# Patient Record
Sex: FEMALE | NOT HISPANIC OR LATINO | Employment: STUDENT
[De-identification: names, ages, dates, MRNs, and addresses within clinical notes are randomized per-mention and may not be internally consistent; named-entity substitution may affect disease eponyms.]

---

## 2017-07-08 ENCOUNTER — HEALTH MAINTENANCE LETTER (OUTPATIENT)
Age: 22
End: 2017-07-08

## 2017-09-13 ENCOUNTER — APPOINTMENT (OUTPATIENT)
Dept: GENERAL RADIOLOGY | Facility: CLINIC | Age: 22
End: 2017-09-13
Attending: FAMILY MEDICINE
Payer: MEDICAID

## 2017-09-13 ENCOUNTER — HOSPITAL ENCOUNTER (EMERGENCY)
Facility: CLINIC | Age: 22
Discharge: HOME | End: 2017-09-13
Attending: FAMILY MEDICINE | Admitting: FAMILY MEDICINE
Payer: MEDICAID

## 2017-09-13 VITALS
TEMPERATURE: 98.5 F | DIASTOLIC BLOOD PRESSURE: 100 MMHG | OXYGEN SATURATION: 99 % | HEART RATE: 94 BPM | WEIGHT: 293 LBS | HEIGHT: 70 IN | SYSTOLIC BLOOD PRESSURE: 142 MMHG | BODY MASS INDEX: 41.95 KG/M2 | RESPIRATION RATE: 22 BRPM

## 2017-09-13 DIAGNOSIS — R07.0 THROAT PAIN: ICD-10-CM

## 2017-09-13 DIAGNOSIS — J20.9 ACUTE BRONCHITIS, UNSPECIFIED ORGANISM: ICD-10-CM

## 2017-09-13 LAB
DEPRECATED S PYO AG THROAT QL EIA: NORMAL
SPECIMEN SOURCE: NORMAL

## 2017-09-13 PROCEDURE — 87081 CULTURE SCREEN ONLY: CPT | Performed by: FAMILY MEDICINE

## 2017-09-13 PROCEDURE — 94640 AIRWAY INHALATION TREATMENT: CPT | Performed by: FAMILY MEDICINE

## 2017-09-13 PROCEDURE — 87880 STREP A ASSAY W/OPTIC: CPT | Performed by: FAMILY MEDICINE

## 2017-09-13 PROCEDURE — 99284 EMERGENCY DEPT VISIT MOD MDM: CPT | Mod: Z6 | Performed by: FAMILY MEDICINE

## 2017-09-13 PROCEDURE — 99284 EMERGENCY DEPT VISIT MOD MDM: CPT | Mod: 25 | Performed by: FAMILY MEDICINE

## 2017-09-13 PROCEDURE — 25000125 ZZHC RX 250: Performed by: FAMILY MEDICINE

## 2017-09-13 PROCEDURE — 25000132 ZZH RX MED GY IP 250 OP 250 PS 637: Performed by: FAMILY MEDICINE

## 2017-09-13 PROCEDURE — 71020 XR CHEST 2 VW: CPT

## 2017-09-13 RX ORDER — ALBUTEROL SULFATE 90 UG/1
2 AEROSOL, METERED RESPIRATORY (INHALATION) EVERY 6 HOURS PRN
Qty: 1 INHALER | Refills: 0 | Status: SHIPPED | OUTPATIENT
Start: 2017-09-13

## 2017-09-13 RX ORDER — ACETAMINOPHEN 500 MG
1000 TABLET ORAL ONCE
Status: COMPLETED | OUTPATIENT
Start: 2017-09-13 | End: 2017-09-13

## 2017-09-13 RX ORDER — AZITHROMYCIN 250 MG/1
TABLET, FILM COATED ORAL
Qty: 6 TABLET | Refills: 0 | Status: SHIPPED | OUTPATIENT
Start: 2017-09-13 | End: 2017-09-18

## 2017-09-13 RX ORDER — ALBUTEROL SULFATE 0.83 MG/ML
2.5 SOLUTION RESPIRATORY (INHALATION) ONCE
Status: COMPLETED | OUTPATIENT
Start: 2017-09-13 | End: 2017-09-13

## 2017-09-13 RX ADMIN — ACETAMINOPHEN 1000 MG: 500 TABLET, FILM COATED ORAL at 19:45

## 2017-09-13 RX ADMIN — ALBUTEROL SULFATE 2.5 MG: 2.5 SOLUTION RESPIRATORY (INHALATION) at 19:45

## 2017-09-13 ASSESSMENT — ENCOUNTER SYMPTOMS
EYE REDNESS: 0
FATIGUE: 1
NAUSEA: 0
VOMITING: 0
ARTHRALGIAS: 0
WOUND: 0
DECREASED CONCENTRATION: 0
SORE THROAT: 1
ACTIVITY CHANGE: 0
SINUS PRESSURE: 1
TROUBLE SWALLOWING: 0
COUGH: 1
SHORTNESS OF BREATH: 1
CONFUSION: 0
COLOR CHANGE: 0
DIFFICULTY URINATING: 0
FEVER: 0
VOICE CHANGE: 0
WEAKNESS: 0
ABDOMINAL PAIN: 0
APPETITE CHANGE: 0
NECK STIFFNESS: 0
HEADACHES: 0
DYSPHORIC MOOD: 0

## 2017-09-13 NOTE — ED AVS SNAPSHOT
[facility], [place], Emergency Department    [address] [place] [id number]    Phone:  [phone]                                       Evie Clement   MRN: [id number]    Department:  [facility], Emergency Department   Date of Visit:  9/13/2017           After Visit Summary Signature Page     I have received my discharge instructions, and my questions have been answered. I have discussed any challenges I see with this plan with the nurse or doctor.    ..........................................................................................................................................  Patient/Patient Representative Signature      ..........................................................................................................................................  Patient Representative Print Name and Relationship to Patient    ..................................................               ................................................  Date                                            Time    ..........................................................................................................................................  Reviewed by Signature/Title    ...................................................              ..............................................  Date                                                            Time

## 2017-09-13 NOTE — LETTER
To Whom it may concern:      Evie Clement was seen in our Emergency Department today, 09/13/17.  I expect her condition to improve over the next few days.  she may return to school when improved.    Sincerely,        Rebel Monae MD

## 2017-09-13 NOTE — ED AVS SNAPSHOT
[facility], Emergency Department    [address] [facility] [place] [address]    Phone:  [phone]                                       Evie Clement   MRN: [id number]    Department:  [facility], Emergency Department   Date of Visit:  9/13/2017           Patient Information     Date Of Birth          1995        Your diagnoses for this visit were:     Acute bronchitis, unspecified organism     Throat pain        You were seen by Rebel Monae MD.      Follow-up Information     Follow up with No Ref-Primary, Physician.        Discharge Instructions         Home.  Zpak for infection.  Albuterol inhaler for cough and breathing.  Results for orders placed or performed during the hospital encounter of 09/13/17   XR Chest 2 Views    Narrative    No acute airspace disease.   Rapid strep screen   Result Value Ref Range    Specimen Description Throat     Rapid Strep A Screen       NEGATIVE: No Group A streptococcal antigen detected by immunoassay, await culture report.             Bronchitis, Antibiotic Treatment (Adult)    Bronchitis is an infection of the air passages (bronchial tubes) in your lungs. It often occurs when you have a cold. This illness is contagious during the first few days and is spread through the air by coughing and sneezing, or by direct contact (touching the sick person and then touching your own eyes, nose, or mouth).  Symptoms of bronchitis include cough with mucus (phlegm) and low-grade fever. Bronchitis usually lasts 7 to 14 days. Mild cases can be treated with simple home remedies. More severe infection is treated with an antibiotic.  Home care  Follow these guidelines when caring for yourself at home:    If your symptoms are severe, rest at home for the first 2 to 3 days. When you go back to your usual activities, don't let yourself get too tired.    Do not smoke. Also avoid being exposed to secondhand smoke.    You may use over-the-counter medicines to control fever or pain,  unless another medicine was prescribed. (Note: If you have chronic liver or kidney disease or have ever had a stomach ulcer or gastrointestinal bleeding, talk with your healthcare provider before using these medicines. Also talk to your provider if you are taking medicine to prevent blood clots.) Aspirin should never be given to anyone younger than 18 years of age who is ill with a viral infection or fever. It may cause severe liver or brain damage.    Your appetite may be poor, so a light diet is fine. Avoid dehydration by drinking 6 to 8 glasses of fluids per day (such as water, soft drinks, sports drinks, juices, tea, or soup). Extra fluids will help loosen secretions in the nose and lungs.    Over-the-counter cough, cold, and sore-throat medicines will not shorten the length of the illness, but they may be helpful to reduce symptoms. (Note: Do not use decongestants if you have high blood pressure.)    Finish all antibiotic medicine. Do this even if you are feeling better after only a few days.  Follow-up care  Follow up with your healthcare provider, or as advised. If you had an X-ray or ECG (electrocardiogram), a specialist will review it. You will be notified of any new findings that may affect your care.  Note: If you are age 65 or older, or if you have a chronic lung disease or condition that affects your immune system, or you smoke, talk to your healthcare provider about having pneumococcal vaccinations and a yearly influenza vaccination (flu shot).  When to seek medical advice  Call your healthcare provider right away if any of these occur:    Fever of 100.4 F (38 C) or higher    Coughing up increased amounts of colored sputum    Weakness, drowsiness, headache, facial pain, ear pain, or a stiff neck  Call 911, or get immediate medical care  Contact emergency services right away if any of these occur.    Coughing up blood    Worsening weakness, drowsiness, headache, or stiff neck    Trouble breathing,  wheezing, or pain with breathing  Date Last Reviewed: 9/13/2015 2000-2017 [employer]. [address] [place] [address], [place] [address]. All rights reserved. This information is not intended as a substitute for professional medical care. Always follow your healthcare professional's instructions.          24 Hour Appointment Hotline       To make an appointment at any [facility], call [phone] ([phone]). If you don't have a family doctor or clinic, we will help you find one. [facility] are conveniently located to serve the needs of you and your family.             Review of your medicines      START taking        Dose / Directions Last dose taken    albuterol 108 (90 BASE) MCG/ACT Inhaler   Commonly known as:  PROAIR HFA/PROVENTIL HFA/VENTOLIN HFA   Dose:  2 puff   Quantity:  1 Inhaler        Inhale 2 puffs into the lungs every 6 hours as needed for shortness of breath / dyspnea or wheezing   Refills:  0        azithromycin 250 MG tablet   Commonly known as:  ZITHROMAX Z-MEMO   Quantity:  6 tablet        Two tablets on the first day, then one tablet daily for the next 4 days   Refills:  0                Prescriptions were sent or printed at these locations (2 Prescriptions)                   Other Prescriptions                Printed at Department/Unit printer (2 of 2)         azithromycin (ZITHROMAX Z-MEMO) 250 MG tablet               albuterol (PROAIR HFA/PROVENTIL HFA/VENTOLIN HFA) 108 (90 BASE) MCG/ACT Inhaler                Procedures and tests performed during your visit     Beta strep group A culture    Rapid strep screen    XR Chest 2 Views      Orders Needing Specimen Collection     None      Pending Results     Date and Time Order Name Status Description    9/13/2017 1949 Beta strep group A culture In process     9/13/2017 1941 XR Chest 2 Views Preliminary             Pending Culture Results     Date and Time Order Name Status Description    9/13/2017 1949 Beta strep group A  "culture In process             Pending Results Instructions     If you had any lab results that were not finalized at the time of your Discharge, you can call the ED Lab Result RN at [phone]. You will be contacted by this team for any positive Lab results or changes in treatment. The nurses are available 7 days a week from 10A to 6:30P.  You can leave a message 24 hours per day and they will return your call.        Thank you for choosing [facility]       Thank you for choosing [facility] for your care. Our goal is always to provide you with excellent care. Hearing back from our patients is one way we can continue to improve our services. Please take a few minutes to complete the written survey that you may receive in the mail after you visit with us. Thank you!        [employer] Information     [employer] lets you send messages to your doctor, view your test results, renew your prescriptions, schedule appointments and more. To sign up, go to [email or web address] . Click on \"Log in\" on the left side of the screen, which will take you to the Welcome page. Then click on \"Sign up Now\" on the right side of the page.     You will be asked to enter the access code listed below, as well as some personal information. Please follow the directions to create your username and password.     Your access code is: [email or web address]  Expires: 2017  9:31 PM     Your access code will  in 90 days. If you need help or a new code, please call your [facility] clinic or [phone].        Care EveryWhere ID     This is your Care EveryWhere ID. This could be used by other organizations to access your [facility] medical records  [id number]        Equal Access to Services     [facility] : Shabbir Burr, haja oneal, bradly duran. So [facility] [phone].    ATENCIÓN: Si habla español, tiene a fletcher disposición servicios gratuitos de asistencia lingüística. Llame al " [phone].    We comply with applicable federal civil rights laws and [place] laws. We do not discriminate on the basis of race, color, national origin, age, disability sex, sexual orientation or gender identity.            After Visit Summary       This is your record. Keep this with you and show to your community pharmacist(s) and doctor(s) at your next visit.

## 2017-09-13 NOTE — ED NOTES
Evie comes in with cough, SOB, congestion in her nose and chest for the last couple days. She has had no meds for this today.

## 2017-09-13 NOTE — LETTER
To Whom it may concern:      Evie Clement was seen in our Emergency Department today, 09/13/17.  I expect her condition to improve over the next few days.  she may return to work when improved.    Sincerely,        Rebel Monae MD

## 2017-09-14 NOTE — DISCHARGE INSTRUCTIONS
Home.  Zpak for infection.  Albuterol inhaler for cough and breathing.  Results for orders placed or performed during the hospital encounter of 09/13/17   XR Chest 2 Views    Narrative    No acute airspace disease.   Rapid strep screen   Result Value Ref Range    Specimen Description Throat     Rapid Strep A Screen       NEGATIVE: No Group A streptococcal antigen detected by immunoassay, await culture report.             Bronchitis, Antibiotic Treatment (Adult)    Bronchitis is an infection of the air passages (bronchial tubes) in your lungs. It often occurs when you have a cold. This illness is contagious during the first few days and is spread through the air by coughing and sneezing, or by direct contact (touching the sick person and then touching your own eyes, nose, or mouth).  Symptoms of bronchitis include cough with mucus (phlegm) and low-grade fever. Bronchitis usually lasts 7 to 14 days. Mild cases can be treated with simple home remedies. More severe infection is treated with an antibiotic.  Home care  Follow these guidelines when caring for yourself at home:    If your symptoms are severe, rest at home for the first 2 to 3 days. When you go back to your usual activities, don't let yourself get too tired.    Do not smoke. Also avoid being exposed to secondhand smoke.    You may use over-the-counter medicines to control fever or pain, unless another medicine was prescribed. (Note: If you have chronic liver or kidney disease or have ever had a stomach ulcer or gastrointestinal bleeding, talk with your healthcare provider before using these medicines. Also talk to your provider if you are taking medicine to prevent blood clots.) Aspirin should never be given to anyone younger than 18 years of age who is ill with a viral infection or fever. It may cause severe liver or brain damage.    Your appetite may be poor, so a light diet is fine. Avoid dehydration by drinking 6 to 8 glasses of fluids per day (such as  water, soft drinks, sports drinks, juices, tea, or soup). Extra fluids will help loosen secretions in the nose and lungs.    Over-the-counter cough, cold, and sore-throat medicines will not shorten the length of the illness, but they may be helpful to reduce symptoms. (Note: Do not use decongestants if you have high blood pressure.)    Finish all antibiotic medicine. Do this even if you are feeling better after only a few days.  Follow-up care  Follow up with your healthcare provider, or as advised. If you had an X-ray or ECG (electrocardiogram), a specialist will review it. You will be notified of any new findings that may affect your care.  Note: If you are age 65 or older, or if you have a chronic lung disease or condition that affects your immune system, or you smoke, talk to your healthcare provider about having pneumococcal vaccinations and a yearly influenza vaccination (flu shot).  When to seek medical advice  Call your healthcare provider right away if any of these occur:    Fever of 100.4 F (38 C) or higher    Coughing up increased amounts of colored sputum    Weakness, drowsiness, headache, facial pain, ear pain, or a stiff neck  Call 911, or get immediate medical care  Contact emergency services right away if any of these occur.    Coughing up blood    Worsening weakness, drowsiness, headache, or stiff neck    Trouble breathing, wheezing, or pain with breathing  Date Last Reviewed: 9/13/2015 2000-2017 [employer]. [address] [place] [address], [place] [address]. All rights reserved. This information is not intended as a substitute for professional medical care. Always follow your healthcare professional's instructions.

## 2017-09-14 NOTE — ED PROVIDER NOTES
[place] EMERGENCY DEPARTMENT ([facility])  9/13/17   History     Chief Complaint   Patient presents with     Cough     HPI  Evie Clement is a 22 year old female who presents to the Emergency Department for evaluation of a cough, sore throat, congestion, facial pain and fatigue. Patient reports having nausea with 1 episode of vomiting on the evening of 9/11/2017. When she woke up the next morning, she had a cough and a sore throat. Today she notes it is uncomfortable to breath and she notes pain with coughing. She also notes her facial pain has improved today. She denies any ear pain, current nausea, positive sick contacts, history of lung infections or smoking .    I have reviewed the Medications, Allergies, Past Medical and Surgical History, and Social History in the Epic system.    No past medical history on file.    No past surgical history on file.    No family history on file.    Social History   Substance Use Topics     Smoking status: Not on file     Smokeless tobacco: Not on file     Alcohol use Not on file       No current facility-administered medications for this encounter.      Current Outpatient Prescriptions   Medication     azithromycin (ZITHROMAX Z-MEMO) 250 MG tablet     albuterol (PROAIR HFA/PROVENTIL HFA/VENTOLIN HFA) 108 (90 BASE) MCG/ACT Inhaler      No Known Allergies      Review of Systems   Constitutional: Positive for fatigue. Negative for activity change, appetite change and fever.   HENT: Positive for congestion, sinus pressure and sore throat. Negative for ear pain, trouble swallowing and voice change.         Positive for facial pain   Eyes: Negative for redness.   Respiratory: Positive for cough and shortness of breath.    Cardiovascular: Positive for chest pain (with coughing).   Gastrointestinal: Negative for abdominal pain, nausea and vomiting.   Genitourinary: Negative for difficulty urinating.   Musculoskeletal: Negative for arthralgias and neck stiffness.   Skin:  "Negative for color change, rash and wound.   Allergic/Immunologic: Negative for immunocompromised state.   Neurological: Negative for weakness and headaches.   Psychiatric/Behavioral: Negative for confusion, decreased concentration and dysphoric mood.   All other systems reviewed and are negative.      Physical Exam   BP: 170/72  Pulse: 92  Temp: 98.5  F (36.9  C)  Resp: 22  Height: 177.8 cm (5' 10\")  Weight: 136.1 kg (300 lb)  SpO2: 99 %  Physical Exam   Constitutional: She is oriented to person, place, and time. She appears well-developed and well-nourished. She appears distressed.   Patient with mother mild distress   HENT:   Head: Normocephalic and atraumatic.   Mouth/Throat: No oropharyngeal exudate.   Posterior pharyngeal erythema but there is no trismus no abscess no uvular shift    Eyes: Conjunctivae and EOM are normal. Pupils are equal, round, and reactive to light. No scleral icterus.   Neck: Normal range of motion. Neck supple. No tracheal deviation present.   Cardiovascular: Regular rhythm.    Pulmonary/Chest: No stridor. She is in respiratory distress. She has no wheezes. She has no rales. She exhibits no tenderness.   Abdominal: She exhibits no distension. There is no tenderness. There is no rebound and no guarding.   Musculoskeletal: She exhibits no edema, tenderness or deformity.   Lymphadenopathy:     She has cervical adenopathy (mild anterior).   Neurological: She is alert and oriented to person, place, and time. She has normal reflexes. No cranial nerve deficit. Coordination normal.   Skin: Skin is warm and dry. No rash noted. She is not diaphoretic. No erythema. No pallor.   Psychiatric: She has a normal mood and affect. Her behavior is normal. Judgment and thought content normal.   Nursing note and vitals reviewed.      ED Course   7:37 PM  The patient was seen and examined by Rebel Monae MD in Room ED12.     ED Course     Procedures         In ER patient evaluated.  Patient will have a rapid " strep along with this chest x-ray albuterol neb Tylenol and will reassess.    Rapid strep was negative.    Chest x-ray negative for any acute infiltrates no pneumothorax no effusion    Tylenol given albuterol neb.    Patient feeling somewhat better      Discussed findings with patient.  Patient comfortable going home.    Critical Care time:  none             Labs Ordered and Resulted from Time of ED Arrival Up to the Time of Departure from the ED   RAPID STREP SCREEN   BETA STREP GROUP A CULTURE     Results for orders placed or performed during the hospital encounter of 09/13/17   XR Chest 2 Views    Narrative    No acute airspace disease.   Rapid strep screen   Result Value Ref Range    Specimen Description Throat     Rapid Strep A Screen       NEGATIVE: No Group A streptococcal antigen detected by immunoassay, await culture report.            Assessments & Plan (with Medical Decision Making)  22-year-old female with several days ongoing cough and shortness of breath wheezing.  Chest x-ray negative no history of asthma.  Rapid strep negative for sore throat at this point though we'll treat for bronchitis Z-Memo given along with albuterol inhaler as a neb did help her also.  Patient follow-up with M.D.           I have reviewed the nursing notes.    I have reviewed the findings, diagnosis, plan and need for follow up with the patient.    Discharge Medication List as of 9/13/2017  9:32 PM      START taking these medications    Details   azithromycin (ZITHROMAX Z-MEMO) 250 MG tablet Two tablets on the first day, then one tablet daily for the next 4 days, Disp-6 tablet, R-0, Local Print      albuterol (PROAIR HFA/PROVENTIL HFA/VENTOLIN HFA) 108 (90 BASE) MCG/ACT Inhaler Inhale 2 puffs into the lungs every 6 hours as needed for shortness of breath / dyspnea or wheezing, Disp-1 Inhaler, R-0, Local Print             Final diagnoses:   Acute bronchitis, unspecified organism   Throat pain     IRc, am serving as  a trained medical scribe to document services personally performed by Rebel Monae MD, based on the provider's statements to me.   I, Rebel Monae MD, was physically present and have reviewed and verified the accuracy of this note documented by Rc Bright.    9/13/2017   [facility], [place], EMERGENCY DEPARTMENT    This note was created at least in part by the use of dragon voice dictation system. Inadvertent typographical errors may still exist.  Rebel Monae MD.         Rebel Monae MD  09/14/17 0014

## 2017-09-15 LAB
BACTERIA SPEC CULT: NORMAL
SPECIMEN SOURCE: NORMAL

## 2023-11-02 ENCOUNTER — APPOINTMENT (OUTPATIENT)
Dept: ULTRASOUND IMAGING | Facility: HOSPITAL | Age: 28
End: 2023-11-02
Attending: EMERGENCY MEDICINE
Payer: COMMERCIAL

## 2023-11-02 ENCOUNTER — APPOINTMENT (OUTPATIENT)
Dept: CT IMAGING | Facility: HOSPITAL | Age: 28
End: 2023-11-02
Attending: EMERGENCY MEDICINE
Payer: COMMERCIAL

## 2023-11-02 ENCOUNTER — HOSPITAL ENCOUNTER (EMERGENCY)
Facility: HOSPITAL | Age: 28
Discharge: HOME | End: 2023-11-03
Attending: EMERGENCY MEDICINE | Admitting: EMERGENCY MEDICINE
Payer: COMMERCIAL

## 2023-11-02 VITALS
TEMPERATURE: 97 F | OXYGEN SATURATION: 97 % | HEART RATE: 93 BPM | HEIGHT: 70 IN | DIASTOLIC BLOOD PRESSURE: 68 MMHG | SYSTOLIC BLOOD PRESSURE: 138 MMHG | BODY MASS INDEX: 41.95 KG/M2 | WEIGHT: 293 LBS | RESPIRATION RATE: 20 BRPM

## 2023-11-02 DIAGNOSIS — R10.9 ABDOMINAL PAIN, UNSPECIFIED ABDOMINAL LOCATION: ICD-10-CM

## 2023-11-02 DIAGNOSIS — N83.209 OVARIAN CYST RUPTURE: ICD-10-CM

## 2023-11-02 DIAGNOSIS — N83.202 LEFT OVARIAN CYST: ICD-10-CM

## 2023-11-02 LAB
ALBUMIN SERPL BCG-MCNC: 4.2 G/DL (ref 3.5–5.2)
ALBUMIN UR-MCNC: 20 MG/DL
ALP SERPL-CCNC: 107 U/L (ref 35–104)
ALT SERPL W P-5'-P-CCNC: 13 U/L (ref 0–50)
ANION GAP SERPL CALCULATED.3IONS-SCNC: 12 MMOL/L (ref 7–15)
APPEARANCE UR: ABNORMAL
AST SERPL W P-5'-P-CCNC: 12 U/L (ref 0–45)
BACTERIA #/AREA URNS HPF: ABNORMAL /HPF
BASOPHILS # BLD AUTO: 0 10E3/UL (ref 0–0.2)
BASOPHILS NFR BLD AUTO: 0 %
BILIRUB DIRECT SERPL-MCNC: <0.2 MG/DL (ref 0–0.3)
BILIRUB SERPL-MCNC: 0.2 MG/DL
BILIRUB UR QL STRIP: NEGATIVE
BUN SERPL-MCNC: 11.1 MG/DL (ref 6–20)
CALCIUM SERPL-MCNC: 9.9 MG/DL (ref 8.6–10)
CHLORIDE SERPL-SCNC: 103 MMOL/L (ref 98–107)
COLOR UR AUTO: ABNORMAL
CREAT SERPL-MCNC: 0.7 MG/DL (ref 0.51–0.95)
DEPRECATED HCO3 PLAS-SCNC: 25 MMOL/L (ref 22–29)
EGFRCR SERPLBLD CKD-EPI 2021: >90 ML/MIN/1.73M2
EOSINOPHIL # BLD AUTO: 0.1 10E3/UL (ref 0–0.7)
EOSINOPHIL NFR BLD AUTO: 1 %
ERYTHROCYTE [DISTWIDTH] IN BLOOD BY AUTOMATED COUNT: 14.5 % (ref 10–15)
GLUCOSE SERPL-MCNC: 97 MG/DL (ref 70–99)
GLUCOSE UR STRIP-MCNC: NEGATIVE MG/DL
HCG UR QL: NEGATIVE
HCT VFR BLD AUTO: 37.9 % (ref 35–47)
HGB BLD-MCNC: 11.5 G/DL (ref 11.7–15.7)
HGB UR QL STRIP: ABNORMAL
HOLD SPECIMEN: NORMAL
IMM GRANULOCYTES # BLD: 0 10E3/UL
IMM GRANULOCYTES NFR BLD: 0 %
KETONES UR STRIP-MCNC: NEGATIVE MG/DL
LEUKOCYTE ESTERASE UR QL STRIP: ABNORMAL
LIPASE SERPL-CCNC: 15 U/L (ref 13–60)
LYMPHOCYTES # BLD AUTO: 2.1 10E3/UL (ref 0.8–5.3)
LYMPHOCYTES NFR BLD AUTO: 20 %
MCH RBC QN AUTO: 24 PG (ref 26.5–33)
MCHC RBC AUTO-ENTMCNC: 30.3 G/DL (ref 31.5–36.5)
MCV RBC AUTO: 79 FL (ref 78–100)
MONOCYTES # BLD AUTO: 0.5 10E3/UL (ref 0–1.3)
MONOCYTES NFR BLD AUTO: 5 %
MUCOUS THREADS #/AREA URNS LPF: PRESENT /LPF
NEUTROPHILS # BLD AUTO: 8 10E3/UL (ref 1.6–8.3)
NEUTROPHILS NFR BLD AUTO: 74 %
NITRATE UR QL: NEGATIVE
NRBC # BLD AUTO: 0 10E3/UL
NRBC BLD AUTO-RTO: 0 /100
PH UR STRIP: 6 [PH] (ref 5–7)
PLATELET # BLD AUTO: 236 10E3/UL (ref 150–450)
POTASSIUM SERPL-SCNC: 4.3 MMOL/L (ref 3.4–5.3)
PROT SERPL-MCNC: 7.6 G/DL (ref 6.4–8.3)
RBC # BLD AUTO: 4.79 10E6/UL (ref 3.8–5.2)
RBC URINE: 24 /HPF
SODIUM SERPL-SCNC: 140 MMOL/L (ref 135–145)
SP GR UR STRIP: 1.03 (ref 1–1.03)
SQUAMOUS EPITHELIAL: 11 /HPF
UROBILINOGEN UR STRIP-MCNC: <2 MG/DL
WBC # BLD AUTO: 10.8 10E3/UL (ref 4–11)
WBC CLUMPS #/AREA URNS HPF: PRESENT /HPF
WBC URINE: 54 /HPF

## 2023-11-02 PROCEDURE — 74177 CT ABD & PELVIS W/CONTRAST: CPT

## 2023-11-02 PROCEDURE — 85025 COMPLETE CBC W/AUTO DIFF WBC: CPT | Performed by: EMERGENCY MEDICINE

## 2023-11-02 PROCEDURE — 96374 THER/PROPH/DIAG INJ IV PUSH: CPT | Mod: 59

## 2023-11-02 PROCEDURE — 250N000011 HC RX IP 250 OP 636: Performed by: EMERGENCY MEDICINE

## 2023-11-02 PROCEDURE — 99285 EMERGENCY DEPT VISIT HI MDM: CPT | Mod: 25

## 2023-11-02 PROCEDURE — 36415 COLL VENOUS BLD VENIPUNCTURE: CPT | Performed by: EMERGENCY MEDICINE

## 2023-11-02 PROCEDURE — 81001 URINALYSIS AUTO W/SCOPE: CPT | Performed by: EMERGENCY MEDICINE

## 2023-11-02 PROCEDURE — 80053 COMPREHEN METABOLIC PANEL: CPT | Performed by: EMERGENCY MEDICINE

## 2023-11-02 PROCEDURE — 96361 HYDRATE IV INFUSION ADD-ON: CPT

## 2023-11-02 PROCEDURE — 83690 ASSAY OF LIPASE: CPT | Performed by: EMERGENCY MEDICINE

## 2023-11-02 PROCEDURE — 96376 TX/PRO/DX INJ SAME DRUG ADON: CPT

## 2023-11-02 PROCEDURE — 87086 URINE CULTURE/COLONY COUNT: CPT | Performed by: EMERGENCY MEDICINE

## 2023-11-02 PROCEDURE — 81025 URINE PREGNANCY TEST: CPT | Performed by: EMERGENCY MEDICINE

## 2023-11-02 PROCEDURE — 258N000003 HC RX IP 258 OP 636: Performed by: EMERGENCY MEDICINE

## 2023-11-02 PROCEDURE — 250N000013 HC RX MED GY IP 250 OP 250 PS 637: Performed by: EMERGENCY MEDICINE

## 2023-11-02 PROCEDURE — 76830 TRANSVAGINAL US NON-OB: CPT

## 2023-11-02 PROCEDURE — 82248 BILIRUBIN DIRECT: CPT | Performed by: EMERGENCY MEDICINE

## 2023-11-02 RX ORDER — ONDANSETRON 4 MG/1
4 TABLET, ORALLY DISINTEGRATING ORAL ONCE
Status: COMPLETED | OUTPATIENT
Start: 2023-11-02 | End: 2023-11-02

## 2023-11-02 RX ORDER — MORPHINE SULFATE 4 MG/ML
4 INJECTION, SOLUTION INTRAMUSCULAR; INTRAVENOUS ONCE
Status: COMPLETED | OUTPATIENT
Start: 2023-11-02 | End: 2023-11-02

## 2023-11-02 RX ORDER — OXYCODONE HYDROCHLORIDE 5 MG/1
10 TABLET ORAL ONCE
Status: COMPLETED | OUTPATIENT
Start: 2023-11-02 | End: 2023-11-02

## 2023-11-02 RX ORDER — IOPAMIDOL 755 MG/ML
100 INJECTION, SOLUTION INTRAVASCULAR ONCE
Status: COMPLETED | OUTPATIENT
Start: 2023-11-02 | End: 2023-11-02

## 2023-11-02 RX ORDER — OXYCODONE AND ACETAMINOPHEN 5; 325 MG/1; MG/1
1 TABLET ORAL EVERY 6 HOURS PRN
Qty: 12 TABLET | Refills: 0 | Status: SHIPPED | OUTPATIENT
Start: 2023-11-02 | End: 2023-11-05

## 2023-11-02 RX ADMIN — OXYCODONE HYDROCHLORIDE 10 MG: 5 TABLET ORAL at 22:51

## 2023-11-02 RX ADMIN — MORPHINE SULFATE 4 MG: 4 INJECTION, SOLUTION INTRAMUSCULAR; INTRAVENOUS at 20:47

## 2023-11-02 RX ADMIN — SODIUM CHLORIDE 1000 ML: 9 INJECTION, SOLUTION INTRAVENOUS at 19:45

## 2023-11-02 RX ADMIN — ONDANSETRON 4 MG: 4 TABLET, ORALLY DISINTEGRATING ORAL at 19:15

## 2023-11-02 RX ADMIN — IOPAMIDOL 100 ML: 755 INJECTION, SOLUTION INTRAVENOUS at 20:28

## 2023-11-02 RX ADMIN — MORPHINE SULFATE 4 MG: 4 INJECTION, SOLUTION INTRAMUSCULAR; INTRAVENOUS at 21:10

## 2023-11-02 ASSESSMENT — ACTIVITIES OF DAILY LIVING (ADL)
ADLS_ACUITY_SCORE: 35
ADLS_ACUITY_SCORE: 35

## 2023-11-02 NOTE — ED PROVIDER NOTES
EMERGENCY DEPARTMENT ENCOUNTER      NAME: Evie Clement  AGE: 28 year old female  YOB: 1995  MRN: [id number]  EVALUATION DATE & TIME: No admission date for patient encounter.    PCP: No Ref-Primary, Physician    ED PROVIDER: Navid Rdz D.O.      Chief Complaint   Patient presents with    Abdominal Pain    Emesis    Nausea       FINAL IMPRESSION:  1. Abdominal pain, unspecified abdominal location        ED COURSE & MEDICAL DECISION MAKIN:58 PM I met with the patient to gather history and to perform my initial exam. I discussed the plan for care while in the Emergency Department.  9:20 PM patient care turned over to Dr. Murrieta         Pertinent Labs & Imaging studies reviewed. (See chart for details)  28 year old female presents to the Emergency Department for evaluation of diffuse abdominal pain with nausea and vomiting.  The patient does report some diarrhea as well.  On the exam, she did have a right lower quadrant tenderness primarily.  Reports that symptoms are similar to her gallbladder pain, but she is status postcholecystectomy.  Initial differential did include appendicitis, diverticulitis, colitis, bowel obstruction, ovarian cyst, ovarian torsion, urolithiasis, other acute process.  CT scan did not show any evidence of inflammatory process that could explain his symptoms including evidence of surgical emergency such as appendicitis.  There is no indication for antibiotics based on imaging testing tonight.  However, there is a significant ovarian cyst, that does appear potentially hemorrhagic.  Will obtain ultrasound to ensure there is no evidence of torsion, though unlikely.  Patient care turned over to Dr. Murrieta pending ultrasound results, do anticipate likely discharge however this will be pending ultrasound results.    Medical Decision Making    History:  Supplemental history from: Documented in chart, if applicable  External Record(s) reviewed: Outpatient Record: [facility]  Practice 23     Work Up:  Chart documentation includes differential considered and any EKGs or imaging independently interpreted by provider, where specified.  In additional to work up documented, I considered the following work up: Documented in chart, if applicable.    External consultation:  Discussion of management with another provider: Documented in chart, if applicable    Complicating factors:  Care impacted by chronic illness: Hypertension, Mental Health, and Other: s/p , cholelithiasis, obesity  Care affected by social determinants of health: N/A    Disposition considerations: Admission considered. Patient was signed out to the oncoming physician, disposition pending.        At the conclusion of the encounter I discussed the results of all of the tests and the disposition. The questions were answered. The patient or family acknowledged understanding and was agreeable with the care plan.        HPI    Patient information was obtained from: patient     Use of : N/A      Evie Clement is a 28 year old female who presents with abdominal pain and vomiting.    Patient reports generalized abdominal pain for 3 days with associated nausea, vomiting, diarrhea. Pain is worse in RLQ. She notes similar pain with her gallbladder, but this is worse. Pain is worsened with ambulation. Denies rash, fever, cough, congestion, chest pain, shortness of breath.     Patient took her hypertension medications today.     Patient endorses use of marijuana, last use today. Denies tobacco or alcohol use.     Per Chart Review:   Patient was seen at [facility] for STD check after a chlamydia exposure. Patient was tested for gonorrhea, chlamydia results are not back yet. UA showed UTI and patient was given doxycycline and clindamycin for a wet prep positive for gardnerella.       REVIEW OF SYSTEMS  Constitutional:  Denies fever, chills, weight loss or weakness  Eyes:  No pain, discharge,  "redness  HENT:  Denies sore throat, ear pain, congestion  Respiratory: No SOB, wheeze or cough  Cardiovascular:  No CP, palpitations  GI:  Positive for abdominal pain, nausea, vomiting, diarrhea  : Denies dysuria, hematuria  Musculoskeletal:  Denies any new muscle/joint pain, swelling or loss of function.  Skin:  Denies rash, pallor  Neurologic:  Denies headache, focal weakness or sensory changes  Lymph: Denies swollen nodes    All other systems negative unless noted in HPI.    PAST MEDICAL HISTORY:  History reviewed. No pertinent past medical history.    PAST SURGICAL HISTORY:  History reviewed. No pertinent surgical history.      CURRENT MEDICATIONS:    No current facility-administered medications for this encounter.     Current Outpatient Medications   Medication    albuterol (PROAIR HFA/PROVENTIL HFA/VENTOLIN HFA) 108 (90 BASE) MCG/ACT Inhaler         ALLERGIES:  Allergies   Allergen Reactions    Metronidazole Other (See Comments)     Vaginal discomfort.    Nifedipine Dizziness and Rash       FAMILY HISTORY:  History reviewed. No pertinent family history.    SOCIAL HISTORY:  Social History     Socioeconomic History    Marital status: Single       VITALS:  Patient Vitals for the past 24 hrs:   BP Temp Temp src Pulse Resp SpO2 Height Weight   11/02/23 2100 -- -- -- 90 -- 99 % -- --   11/02/23 2051 -- -- -- 89 -- 100 % -- --   11/02/23 1852 (!) 173/89 97  F (36.1  C) Temporal 97 20 100 % 1.778 m (5' 10\") (!) 181.4 kg (400 lb)       PHYSICAL EXAM    VITAL SIGNS: BP (!) 173/89   Pulse 90   Temp 97  F (36.1  C) (Temporal)   Resp 20   Ht 1.778 m (5' 10\")   Wt (!) 181.4 kg (400 lb)   LMP 11/02/2023 (Exact Date)   SpO2 99%   BMI 57.39 kg/m      General Appearance: Uncomfortable appearing, obese, no acute distress   Head:  Normocephalic, without obvious abnormality, atraumatic  Eyes:  PERRL, conjunctiva/corneas clear, EOM's intact,  ENT:  Lips, mucosa, and tongue normal, membranes are moist without pallor  Neck: "  Normal ROM, symmetrical, trachea midline    Cardio:  Regular rate and rhythm, no murmur, rub or gallop, 2+ pulses symmetric in all extremities  Pulm:  Clear to auscultation bilaterally, respirations unlabored,  Abdomen:  Soft, no rebound or guarding. Diffuse abdominal tenderness, worse in RLQ  Musculoskeletal: Full ROM, no edema, no cyanosis, good ROM of major joints  Integument:  Warm, Dry, No erythema, No rash.    Neurologic:  Alert & oriented.  No focal deficits appreciated.  Ambulatory.  Psychiatric:  Affect normal, Judgment normal, Mood normal.      LABS  Results for orders placed or performed during the hospital encounter of 11/02/23 (from the past 24 hour(s))   CBC with Platelets & Differential    Narrative    The following orders were created for panel order CBC with Platelets & Differential.  Procedure                               Abnormality         Status                     ---------                               -----------         ------                     CBC with platelets and d...[[id number]]  Abnormal            Final result                 Please view results for these tests on the individual orders.   Comprehensive metabolic panel   Result Value Ref Range    Sodium 140 135 - 145 mmol/L    Potassium 4.3 3.4 - 5.3 mmol/L    Carbon Dioxide (CO2) 25 22 - 29 mmol/L    Anion Gap 12 7 - 15 mmol/L    Urea Nitrogen 11.1 6.0 - 20.0 mg/dL    Creatinine 0.70 0.51 - 0.95 mg/dL    GFR Estimate >90 >60 mL/min/1.73m2    Calcium 9.9 8.6 - 10.0 mg/dL    Chloride 103 98 - 107 mmol/L    Glucose 97 70 - 99 mg/dL    Alkaline Phosphatase 107 (H) 35 - 104 U/L    AST 12 0 - 45 U/L    ALT 13 0 - 50 U/L    Protein Total 7.6 6.4 - 8.3 g/dL    Albumin 4.2 3.5 - 5.2 g/dL    Bilirubin Total 0.2 <=1.2 mg/dL   [facility] Draw    Narrative    The following orders were created for panel order [facility] Draw.  Procedure                               Abnormality         Status                     ---------                                -----------         ------                     Extra Red Top Tube[[id number]]                               Final result               Extra Green Top (Lithium...[[id number]]                                                 Extra Purple Top Tube[[id number]]                                                         Please view results for these tests on the individual orders.   Lipase   Result Value Ref Range    Lipase 15 13 - 60 U/L   Bilirubin direct   Result Value Ref Range    Bilirubin Direct <0.20 0.00 - 0.30 mg/dL   CBC with platelets and differential   Result Value Ref Range    WBC Count 10.8 4.0 - 11.0 10e3/uL    RBC Count 4.79 3.80 - 5.20 10e6/uL    Hemoglobin 11.5 (L) 11.7 - 15.7 g/dL    Hematocrit 37.9 35.0 - 47.0 %    MCV 79 78 - 100 fL    MCH 24.0 (L) 26.5 - 33.0 pg    MCHC 30.3 (L) 31.5 - 36.5 g/dL    RDW 14.5 10.0 - 15.0 %    Platelet Count 236 150 - 450 10e3/uL    % Neutrophils 74 %    % Lymphocytes 20 %    % Monocytes 5 %    % Eosinophils 1 %    % Basophils 0 %    % Immature Granulocytes 0 %    NRBCs per 100 WBC 0 <1 /100    Absolute Neutrophils 8.0 1.6 - 8.3 10e3/uL    Absolute Lymphocytes 2.1 0.8 - 5.3 10e3/uL    Absolute Monocytes 0.5 0.0 - 1.3 10e3/uL    Absolute Eosinophils 0.1 0.0 - 0.7 10e3/uL    Absolute Basophils 0.0 0.0 - 0.2 10e3/uL    Absolute Immature Granulocytes 0.0 <=0.4 10e3/uL    Absolute NRBCs 0.0 10e3/uL   Extra Red Top Tube   Result Value Ref Range    Hold Specimen [facility]    UA with Microscopic reflex to Culture    Specimen: Urine, Midstream   Result Value Ref Range    Color Urine Light Yellow Colorless, Straw, Light Yellow, Yellow    Appearance Urine Turbid (A) Clear    Glucose Urine Negative Negative mg/dL    Bilirubin Urine Negative Negative    Ketones Urine Negative Negative mg/dL    Specific Gravity Urine 1.027 1.001 - 1.030    Blood Urine >1.0 mg/dL (A) Negative    pH Urine 6.0 5.0 - 7.0    Protein Albumin Urine 20 (A) Negative mg/dL    Urobilinogen Urine <2.0 <2.0 mg/dL    Nitrite  Urine Negative Negative    Leukocyte Esterase Urine 500 Brain/uL (A) Negative    Bacteria Urine Few (A) None Seen /HPF    WBC Clumps Urine Present (A) None Seen /HPF    Mucus Urine Present (A) None Seen /LPF    RBC Urine 24 (H) <=2 /HPF    WBC Urine 54 (H) <=5 /HPF    Squamous Epithelials Urine 11 (H) <=1 /HPF    Narrative    Urine Culture ordered based on laboratory criteria   HCG qualitative urine   Result Value Ref Range    hCG Urine Qualitative Negative Negative   CT Abdomen Pelvis w Contrast    Narrative    EXAM: CT ABDOMEN PELVIS W CONTRAST  LOCATION: [facility]  DATE: 11/2/2023    INDICATION: Right lower quadrant abdominal pain.  COMPARISON: None.  TECHNIQUE: CT scan of the abdomen and pelvis was performed following injection of IV contrast. Multiplanar reformats were obtained. Dose reduction techniques were used.  CONTRAST: 100 mL Isovue 370    FINDINGS:   LOWER CHEST: Unremarkable.    HEPATOBILIARY: Liver appears normal. Status post cholecystectomy. No biliary dilatation.    PANCREAS: Normal.    SPLEEN: Normal.    ADRENAL GLANDS: Normal.    KIDNEYS/BLADDER: Normal.    BOWEL: No obstruction or inflammatory change. Normal appendix. No evidence of acute appendicitis.    LYMPH NODES: No lymphadenopathy.    VASCULATURE: Unremarkable.    PELVIC ORGANS: Left ovarian low-attenuation lesion measuring 3.9 cm and 37 Hounsfield units. Uterus and right ovary appear unremarkable.    MUSCULOSKELETAL: Multilevel degenerative changes of the spine.      Impression    IMPRESSION:   1.  No acute findings or inflammatory changes in the abdomen or pelvis. No acute appendicitis.    2.  Left ovarian 3.9 cm lesion, possibly hemorrhagic cyst. Consider correlation with pelvic ultrasound.         RADIOLOGY  CT Abdomen Pelvis w Contrast   Final Result   IMPRESSION:    1.  No acute findings or inflammatory changes in the abdomen or pelvis. No acute appendicitis.      2.  Left ovarian 3.9 cm lesion, possibly  hemorrhagic cyst. Consider correlation with pelvic ultrasound.      US Pelvic Complete with Transvaginal    (Results Pending)              MEDICATIONS GIVEN IN THE EMERGENCY:  Medications   ondansetron (ZOFRAN ODT) ODT tab 4 mg (4 mg Oral $Given 11/2/23 1915)   sodium chloride 0.9% BOLUS 1,000 mL (1,000 mLs Intravenous $New Bag 11/2/23 1945)   iopamidol (ISOVUE-370) solution 100 mL (100 mLs Intravenous $Given 11/2/23 2028)   morphine (PF) injection 4 mg (4 mg Intravenous $Given 11/2/23 2047)   morphine (PF) injection 4 mg (4 mg Intravenous $Given 11/2/23 2110)       NEW PRESCRIPTIONS STARTED AT TODAY'S ER VISIT  New Prescriptions    No medications on file        I, Iliana Pagan, am serving as a scribe to document services personally performed by Navid Rdz D.O., based on my observations and the provider's statements to me.  I, Navid Rdz D.O., attest that Iliana Pagan is acting in a scribe capacity, has observed my performance of the services and has documented them in accordance with my direction.     Navid Rdz D.O.  Emergency Medicine  [facility] EMERGENCY DEPARTMENT  [facility] [facility] [id number] [phone]  Dept: [phone]       Navid Rdz,   11/03/23 1933

## 2023-11-02 NOTE — ED TRIAGE NOTES
Pt reports diffuse abd pain for past 3 days.  Pt reports last BM was 3 days ago and pt had been having diarrhea prior to that.  Pt reports she has been nauseated for past 3 days, had 1 episode of emesis.  Pt is hypertensive in triage.  Pt takes HTM medication and has not missed any doses.      Triage Assessment (Adult)       Row Name 11/02/23 2537          Triage Assessment    Airway WDL WDL        Respiratory WDL    Respiratory WDL WDL        Skin Circulation/Temperature WDL    Skin Circulation/Temperature WDL WDL        Cardiac WDL    Cardiac WDL WDL        Peripheral/Neurovascular WDL    Peripheral Neurovascular WDL WDL        Cognitive/Neuro/Behavioral WDL    Cognitive/Neuro/Behavioral WDL WDL

## 2023-11-03 ENCOUNTER — HOSPITAL ENCOUNTER (EMERGENCY)
Facility: HOSPITAL | Age: 28
Discharge: ANOTHER HOSPITAL | End: 2023-11-03
Attending: EMERGENCY MEDICINE | Admitting: EMERGENCY MEDICINE
Payer: COMMERCIAL

## 2023-11-03 ENCOUNTER — HOSPITAL ENCOUNTER (OUTPATIENT)
Facility: CLINIC | Age: 28
Setting detail: OBSERVATION
Discharge: HOME | End: 2023-11-04
Attending: HOSPITALIST | Admitting: HOSPITALIST
Payer: COMMERCIAL

## 2023-11-03 ENCOUNTER — APPOINTMENT (OUTPATIENT)
Dept: ULTRASOUND IMAGING | Facility: HOSPITAL | Age: 28
End: 2023-11-03
Attending: EMERGENCY MEDICINE
Payer: COMMERCIAL

## 2023-11-03 VITALS
SYSTOLIC BLOOD PRESSURE: 140 MMHG | OXYGEN SATURATION: 98 % | DIASTOLIC BLOOD PRESSURE: 67 MMHG | HEART RATE: 90 BPM | BODY MASS INDEX: 58.52 KG/M2 | WEIGHT: 293 LBS | TEMPERATURE: 98 F | RESPIRATION RATE: 18 BRPM

## 2023-11-03 DIAGNOSIS — R10.84 GENERALIZED ABDOMINAL PAIN: ICD-10-CM

## 2023-11-03 DIAGNOSIS — N83.202 OVARIAN CYST, LEFT: ICD-10-CM

## 2023-11-03 LAB
ALBUMIN SERPL BCG-MCNC: 4.1 G/DL (ref 3.5–5.2)
ALP SERPL-CCNC: 118 U/L (ref 35–104)
ALT SERPL W P-5'-P-CCNC: 45 U/L (ref 0–50)
ANION GAP SERPL CALCULATED.3IONS-SCNC: 11 MMOL/L (ref 7–15)
AST SERPL W P-5'-P-CCNC: 26 U/L (ref 0–45)
BASOPHILS # BLD AUTO: 0 10E3/UL (ref 0–0.2)
BASOPHILS NFR BLD AUTO: 0 %
BILIRUB DIRECT SERPL-MCNC: 0.34 MG/DL (ref 0–0.3)
BILIRUB SERPL-MCNC: 1 MG/DL
BUN SERPL-MCNC: 7 MG/DL (ref 6–20)
CALCIUM SERPL-MCNC: 9.8 MG/DL (ref 8.6–10)
CHLORIDE SERPL-SCNC: 99 MMOL/L (ref 98–107)
CREAT SERPL-MCNC: 0.63 MG/DL (ref 0.51–0.95)
DEPRECATED HCO3 PLAS-SCNC: 25 MMOL/L (ref 22–29)
EGFRCR SERPLBLD CKD-EPI 2021: >90 ML/MIN/1.73M2
EOSINOPHIL # BLD AUTO: 0 10E3/UL (ref 0–0.7)
EOSINOPHIL NFR BLD AUTO: 0 %
ERYTHROCYTE [DISTWIDTH] IN BLOOD BY AUTOMATED COUNT: 14.5 % (ref 10–15)
GLUCOSE SERPL-MCNC: 135 MG/DL (ref 70–99)
HCT VFR BLD AUTO: 38.4 % (ref 35–47)
HGB BLD-MCNC: 12 G/DL (ref 11.7–15.7)
HOLD SPECIMEN: NORMAL
IMM GRANULOCYTES # BLD: 0.1 10E3/UL
IMM GRANULOCYTES NFR BLD: 1 %
LIPASE SERPL-CCNC: 10 U/L (ref 13–60)
LYMPHOCYTES # BLD AUTO: 0.9 10E3/UL (ref 0.8–5.3)
LYMPHOCYTES NFR BLD AUTO: 5 %
MCH RBC QN AUTO: 24.3 PG (ref 26.5–33)
MCHC RBC AUTO-ENTMCNC: 31.3 G/DL (ref 31.5–36.5)
MCV RBC AUTO: 78 FL (ref 78–100)
MONOCYTES # BLD AUTO: 0.7 10E3/UL (ref 0–1.3)
MONOCYTES NFR BLD AUTO: 4 %
NEUTROPHILS # BLD AUTO: 15 10E3/UL (ref 1.6–8.3)
NEUTROPHILS NFR BLD AUTO: 90 %
NRBC # BLD AUTO: 0 10E3/UL
NRBC BLD AUTO-RTO: 0 /100
PLATELET # BLD AUTO: 230 10E3/UL (ref 150–450)
POTASSIUM SERPL-SCNC: 4.2 MMOL/L (ref 3.4–5.3)
PROT SERPL-MCNC: 7.7 G/DL (ref 6.4–8.3)
RBC # BLD AUTO: 4.93 10E6/UL (ref 3.8–5.2)
SODIUM SERPL-SCNC: 135 MMOL/L (ref 135–145)
WBC # BLD AUTO: 16.6 10E3/UL (ref 4–11)

## 2023-11-03 PROCEDURE — 80053 COMPREHEN METABOLIC PANEL: CPT | Performed by: EMERGENCY MEDICINE

## 2023-11-03 PROCEDURE — 96375 TX/PRO/DX INJ NEW DRUG ADDON: CPT

## 2023-11-03 PROCEDURE — 250N000011 HC RX IP 250 OP 636: Mod: JZ | Performed by: EMERGENCY MEDICINE

## 2023-11-03 PROCEDURE — 96374 THER/PROPH/DIAG INJ IV PUSH: CPT

## 2023-11-03 PROCEDURE — 258N000003 HC RX IP 258 OP 636: Performed by: EMERGENCY MEDICINE

## 2023-11-03 PROCEDURE — 36415 COLL VENOUS BLD VENIPUNCTURE: CPT | Performed by: EMERGENCY MEDICINE

## 2023-11-03 PROCEDURE — 85025 COMPLETE CBC W/AUTO DIFF WBC: CPT | Performed by: EMERGENCY MEDICINE

## 2023-11-03 PROCEDURE — 250N000013 HC RX MED GY IP 250 OP 250 PS 637: Performed by: EMERGENCY MEDICINE

## 2023-11-03 PROCEDURE — 99285 EMERGENCY DEPT VISIT HI MDM: CPT | Mod: 25

## 2023-11-03 PROCEDURE — 83690 ASSAY OF LIPASE: CPT | Performed by: EMERGENCY MEDICINE

## 2023-11-03 PROCEDURE — 93976 VASCULAR STUDY: CPT

## 2023-11-03 PROCEDURE — 96361 HYDRATE IV INFUSION ADD-ON: CPT

## 2023-11-03 RX ORDER — KETOROLAC TROMETHAMINE 15 MG/ML
15 INJECTION, SOLUTION INTRAMUSCULAR; INTRAVENOUS ONCE
Status: COMPLETED | OUTPATIENT
Start: 2023-11-03 | End: 2023-11-03

## 2023-11-03 RX ORDER — OXYCODONE HYDROCHLORIDE 5 MG/1
5 TABLET ORAL ONCE
Status: COMPLETED | OUTPATIENT
Start: 2023-11-03 | End: 2023-11-03

## 2023-11-03 RX ORDER — IBUPROFEN 200 MG
600 TABLET ORAL EVERY 4 HOURS PRN
COMMUNITY

## 2023-11-03 RX ORDER — ONDANSETRON 2 MG/ML
4 INJECTION INTRAMUSCULAR; INTRAVENOUS ONCE
Status: COMPLETED | OUTPATIENT
Start: 2023-11-03 | End: 2023-11-03

## 2023-11-03 RX ORDER — LAMOTRIGINE 100 MG/1
100 TABLET ORAL DAILY
COMMUNITY

## 2023-11-03 RX ORDER — LISINOPRIL 5 MG/1
5 TABLET ORAL DAILY
COMMUNITY

## 2023-11-03 RX ORDER — PROPRANOLOL HYDROCHLORIDE 20 MG/1
20 TABLET ORAL 2 TIMES DAILY
COMMUNITY

## 2023-11-03 RX ORDER — ACETAMINOPHEN 500 MG
500-1000 TABLET ORAL EVERY 6 HOURS PRN
COMMUNITY

## 2023-11-03 RX ADMIN — KETOROLAC TROMETHAMINE 15 MG: 15 INJECTION, SOLUTION INTRAMUSCULAR; INTRAVENOUS at 17:49

## 2023-11-03 RX ADMIN — SODIUM CHLORIDE 1000 ML: 9 INJECTION, SOLUTION INTRAVENOUS at 17:12

## 2023-11-03 RX ADMIN — ONDANSETRON 4 MG: 2 INJECTION INTRAMUSCULAR; INTRAVENOUS at 00:19

## 2023-11-03 RX ADMIN — OXYCODONE HYDROCHLORIDE 5 MG: 5 TABLET ORAL at 19:11

## 2023-11-03 RX ADMIN — ONDANSETRON 4 MG: 2 INJECTION INTRAMUSCULAR; INTRAVENOUS at 17:49

## 2023-11-03 ASSESSMENT — ACTIVITIES OF DAILY LIVING (ADL)
CONCENTRATING,_REMEMBERING_OR_MAKING_DECISIONS_DIFFICULTY: NO
FALL_HISTORY_WITHIN_LAST_SIX_MONTHS: NO
CHANGE_IN_FUNCTIONAL_STATUS_SINCE_ONSET_OF_CURRENT_ILLNESS/INJURY: YES
DRESSING/BATHING_DIFFICULTY: NO
DOING_ERRANDS_INDEPENDENTLY_DIFFICULTY: NO
DIFFICULTY_EATING/SWALLOWING: NO
HEARING_DIFFICULTY_OR_DEAF: NO
ADLS_ACUITY_SCORE: 35
DIFFICULTY_COMMUNICATING: NO
ADLS_ACUITY_SCORE: 35
WALKING_OR_CLIMBING_STAIRS_DIFFICULTY: NO
WEAR_GLASSES_OR_BLIND: OTHER (SEE COMMENTS)
TOILETING_ISSUES: NO

## 2023-11-03 NOTE — ED NOTES
"EMERGENCY DEPARTMENT SIGN OUT NOTE        ED COURSE AND MEDICAL DECISION MAKING  Patient was signed out to me by Dr Navid Rdz at 9:33 PM  10:45 PM I updated the patient with imaging results and discussed the plan for discharge. Patient still complains of pain. Will try more pain medication prior to discharge   11:22 PM I spoke with Dr. Casper with OBGYN  11:35 PM I met with the patient and discussed the plan for discharge   11:54 PM I spoke with Dr. Casper who is present in the ED and has met with the patient     In brief, Evie Clement is a 28 year old female who initially presented with abdominal pain      \"Patient reports generalized abdominal pain for 3 days with associated nausea, vomiting, diarrhea. Pain is worse in RLQ. She notes similar pain with her gallbladder, but this is worse. Pain is worsened with ambulation. Denies rash, fever, cough, congestion, chest pain, shortness of breath.\"    At time of sign out, disposition was pending ultrasound results   Ultrasound results returned showing a left ovarian cyst.  There was documented flow to the ovary but did seem faint according to radiology read.  Right ovary was not visualized.  Patient's pain started to come back and I gave her oral dose of pain medication.  Patient's pain improved at that and on examination does not have surgical abdomen in fact her pain is more on the right side.  The cyst is on the left side and I did discuss this with Dr. Casper from gynecology who came and saw the patient.  Unlikely that this is too worsening and that the arterial flow is sufficient but limited because of the cyst.  Patient suspected to have hemorrhagic and possibly ruptured cyst causing his pain.  Pain is improved here and after discussion with OB and confirmation of ultrasound findings with radiologist patient will be plan to follow-up with OB/GYN in 1 week and phone numbers and prescriptions were given.    FINAL IMPRESSION    1. Abdominal pain, unspecified abdominal " location    2. Left ovarian cyst    3. Ovarian cyst rupture        ED MEDS  Medications   ondansetron (ZOFRAN) injection 4 mg (has no administration in time range)   ondansetron (ZOFRAN ODT) ODT tab 4 mg (4 mg Oral $Given 11/2/23 1915)   sodium chloride 0.9% BOLUS 1,000 mL (0 mLs Intravenous Stopped 11/2/23 2346)   iopamidol (ISOVUE-370) solution 100 mL (100 mLs Intravenous $Given 11/2/23 2028)   morphine (PF) injection 4 mg (4 mg Intravenous $Given 11/2/23 2047)   morphine (PF) injection 4 mg (4 mg Intravenous $Given 11/2/23 2110)   oxyCODONE (ROXICODONE) tablet 10 mg (10 mg Oral $Given 11/2/23 2251)       LAB  Labs Ordered and Resulted from Time of ED Arrival to Time of ED Departure   COMPREHENSIVE METABOLIC PANEL - Abnormal       Result Value    Sodium 140      Potassium 4.3      Carbon Dioxide (CO2) 25      Anion Gap 12      Urea Nitrogen 11.1      Creatinine 0.70      GFR Estimate >90      Calcium 9.9      Chloride 103      Glucose 97      Alkaline Phosphatase 107 (*)     AST 12      ALT 13      Protein Total 7.6      Albumin 4.2      Bilirubin Total 0.2     CBC WITH PLATELETS AND DIFFERENTIAL - Abnormal    WBC Count 10.8      RBC Count 4.79      Hemoglobin 11.5 (*)     Hematocrit 37.9      MCV 79      MCH 24.0 (*)     MCHC 30.3 (*)     RDW 14.5      Platelet Count 236      % Neutrophils 74      % Lymphocytes 20      % Monocytes 5      % Eosinophils 1      % Basophils 0      % Immature Granulocytes 0      NRBCs per 100 WBC 0      Absolute Neutrophils 8.0      Absolute Lymphocytes 2.1      Absolute Monocytes 0.5      Absolute Eosinophils 0.1      Absolute Basophils 0.0      Absolute Immature Granulocytes 0.0      Absolute NRBCs 0.0     ROUTINE UA WITH MICROSCOPIC REFLEX TO CULTURE - Abnormal    Color Urine Light Yellow      Appearance Urine Turbid (*)     Glucose Urine Negative      Bilirubin Urine Negative      Ketones Urine Negative      Specific Gravity Urine 1.027      Blood Urine >1.0 mg/dL (*)     pH Urine  6.0      Protein Albumin Urine 20 (*)     Urobilinogen Urine <2.0      Nitrite Urine Negative      Leukocyte Esterase Urine 500 Brain/uL (*)     Bacteria Urine Few (*)     WBC Clumps Urine Present (*)     Mucus Urine Present (*)     RBC Urine 24 (*)     WBC Urine 54 (*)     Squamous Epithelials Urine 11 (*)    LIPASE - Normal    Lipase 15     BILIRUBIN DIRECT - Normal    Bilirubin Direct <0.20     HCG QUALITATIVE URINE - Normal    hCG Urine Qualitative Negative     GLUCOSE MONITOR NURSING POCT   URINE CULTURE       EKG      RADIOLOGY    US Pelvic Complete with Transvaginal   Final Result   IMPRESSION:   1.  3.7 cm left ovarian cyst requiring no follow-up. A faint arterial Doppler tracing is obtained, though evaluation is significantly compromised due to anatomic positioning of the ovary. The right ovary was not visualized.               CT Abdomen Pelvis w Contrast   Final Result   IMPRESSION:    1.  No acute findings or inflammatory changes in the abdomen or pelvis. No acute appendicitis.      2.  Left ovarian 3.9 cm lesion, possibly hemorrhagic cyst. Consider correlation with pelvic ultrasound.          DISCHARGE MEDS  New Prescriptions    OXYCODONE-ACETAMINOPHEN (PERCOCET) 5-325 MG TABLET    Take 1 tablet by mouth every 6 hours as needed for pain       Wai Murrieta MD  [facility] EMERGENCY DEPARTMENT  [address] [facility] [address]  [phone]       Wai Murrieta MD  11/03/23 0012

## 2023-11-03 NOTE — CONSULTS
CONSULTATION - PELVIC PAIN     NAME: Evie Clement   : 1995   MRN: [id number]      [facility]    ADMISSION DATE: 2023    PCP:  No Ref-Primary, Physician     CHIEF COMPLAINT: pelvic pain     HPI: I have been requested by the ER physician to evaluate Evie Clement for pelvic pain. Patient is a 28 year old  1 para 1-0-0-1 . female. History is obtained through the patient and chart review.  She has had 3 days of lower abdominal pain that is radiating and into the upper abdomen.  Today got severe and that is what brought her to the emergency room.  She also had some nausea vomiting earlier today.  She denies any fever.  She uses Nexplanon for birth control and gets a period every month and the period started today.  She has had no other vaginal discharge.  She denies any fever.  She denies any diarrhea.  She is currently being treated by her family practitioner for UTI.  OB history: 1  section 2 years ago without complication  GYN history: No history of cysts or STDs      PAST MEDICAL HISTORY:  History reviewed. No pertinent past medical history.    PAST SURGICAL HISTORY:   section  Laparoscopic cholecystectomy    SOCIAL HISTORY:  Social History     Socioeconomic History    Marital status: Single     Spouse name: Not on file    Number of children: Not on file    Years of education: Not on file    Highest education level: Not on file   Occupational History    Not on file   Tobacco Use    Smoking status: Not on file    Smokeless tobacco: Not on file   Substance and Sexual Activity    Alcohol use: Not on file    Drug use: Not on file    Sexual activity: Not on file   Other Topics Concern    Not on file   Social History Narrative    Not on file     Social Determinants of Health     Financial Resource Strain: Not on file   Food Insecurity: Not on file   Transportation Needs: Not on file   Physical Activity: Not on file   Stress: Not on file   Social Connections: Not on file  "  Interpersonal Safety: Not on file   Housing Stability: Not on file       MEDICATIONS:  No current facility-administered medications for this encounter.     Current Outpatient Medications   Medication    oxyCODONE-acetaminophen (PERCOCET) 5-325 MG tablet    albuterol (PROAIR HFA/PROVENTIL HFA/VENTOLIN HFA) 108 (90 BASE) MCG/ACT Inhaler       ALLERGIES:  Allergies   Allergen Reactions    Metronidazole Other (See Comments)     Vaginal discomfort.    Nifedipine Dizziness and Rash       REVIEW OF SYSTEMS    Negative except what is stated in the HPI    PHYSICAL EXAM:  /68   Pulse 93   Temp 97  F (36.1  C) (Temporal)   Resp 20   Ht 1.778 m (5' 10\")   Wt (!) 181.4 kg (400 lb)   LMP 11/02/2023 (Exact Date)   SpO2 97%   BMI 57.39 kg/m     General Appearance: Alert, appropriate appearance for age. No acute distress,   HEENT : Grossly normal  Chest/Respiratory: Normal chest wall and respirations. Clear to auscultation.,   Cardiovascular: Regular rate and rhythm   Gastrointestinal: Obese, soft, tender mildly in the left lower quadrant and right lower quadrant.  No guarding or rebound.  Pelvic exam was deferred as it was done by the emergency room physician earlier and did not sense a significant amount of tenderness per the doctor.  She did have normal menstrual flow.  Musculoskeletal Exam: Back IS non-tender, no cva tenderness, moving all four extremities  Skin: no rash or abnormalities,   Neurologic: Normal gait and speech, no tremor  Psychiatric: Alert and oriented, appropriate affect.    LABS  Lab Results   Component Value Date    HGB 11.5 (L) 11/02/2023   hCG negative  White blood cell count 10.8  Urine suspicious for UTI    IMAGING:  CT without any evidence of appendicitis or other acute abnormality.  There was a small left-sided ovarian cyst.  Ultrasound showed a 2 to 3 cm simple/follicular left ovarian cyst with some blood flow going to this ovary.  The other ovary was normal and the uterus was normal.  " There is no edema around the ovary.  There was no ascites noted.  I spoke with the radiologist who read the scan who agreed with this assessment.      IMPRESSION:  Pelvic pain likely due to an ovarian cyst that does not appear to be undergoing a torsion.  She also is on her period and dysmenorrhea could be possible.  She also has UTI    RECOMMENDATIONS:  We discussed the options for the cyst which would include expectant management with pain meds or laparoscopic evaluation with possible cystectomy or oophorectomy.  She would like to avoid surgery.  I discussed that if there was a torsion that progressed that she would likely need to have the ovary removed.  All questions answered.  She verbalized understanding above.  Since her pain is somewhat better with oral pain meds we will send her home with these and to continue her antibiotics for her UTI.  She will follow-up in the office in 1 week for reevaluation of the exam.  The plan will be to also repeat the ultrasound in 4 to 6 weeks if she continues to show improvement.  If things worsen she will come back to the emergency room and knows that she may need to undergo laparoscopic evaluation at that point.            Jean Casper MD       CC: No Ref-Primary, Physician,

## 2023-11-03 NOTE — ED NOTES
Pt discharged to home ambulatory at 2355. All questions answered. Pt expressed understanding of info

## 2023-11-03 NOTE — ED PROVIDER NOTES
Emergency Department Encounter     Evaluation Date & Time:   No admission date for patient encounter.    CHIEF COMPLAINT:  Abdominal Pain      Triage Note:She has had abdominal pain for four days. She was seen here last night and now returned.      Impression and Plan       FINAL IMPRESSION:    ICD-10-CM    1. Generalized abdominal pain  R10.84       2. Ovarian cyst, left  N83.202           ED COURSE & MEDICAL DECISION MAKIN:55 PM I met with the patient, obtained history, performed physical exam, and discussed ED plan.  8:34 PM I spoke to OB, Dr. Paulson.  We discussed the plan for discharge and the patient is agreeable. Reviewed supportive cares, symptomatic treatment, outpatient follow up, and reasons to return to the Emergency Department. Patient to be discharged by ED RN.    28 year old female, history of HTN, mood disorder, morbid obesity and s/p cholecystectomy, who returns to this ED for evaluation of stabbing, cramping, aching generalized abdominal pain associated with nausea and multiple episodes of vomiting throughout the night. No diarrhea with last BM 4 days ago. Denies urinary symptoms and fevers.     She was seen in this ED for these symptoms yesterday with evaluation including CT abdomen / pelvis and pelvic ultrasound remarkable for a simple 3.7cm left ovarian cyst.    On exam, abdomen is soft with mild to moderate tenderness to palpation across the upper abdomen and mild tenderness to palpation across the lower abdomen; no peritoneal signs.    Labs remarkable for leukocytosis (WBC 16.6) with left shift (ANC 15) - consider stress demargination from vomiting, as WBC was normal on check yesterday. No anemia (Hb 12.0).  She has no electrolyte derangements or renal impairment.  No laboratory evidence of hepatitis or pancreatitis with minimally elevated direct bilirubin (0.34).    Repeat pelvic ultrasound performed and demonstrated unchanged simple appearing left ovarian 3.6 cm cyst; evaluation  limited by depth of the ovaries, which were seen on transabdominal imaging only. Within this limitation, no convincing evidence of ovarian torsion.    Risks of CT abdomen / pelvis felt to outweigh benefit given unremarkable appearance one day ago, and that she had symptoms for 3 days prior to obtaining CT imaging. Discussed with patient and mother who are in agreement.    Patient is very concerned that her pain is related to the ovarian cyst and reported that they were going to take her to the OR last night due to torsion of the ovary and also because the cyst ruptured. I reviewed the OB note and there was discussion of laparoscopy, however no mention of cyst rupture.     I have very low suspicion that her pain, predominantly in the upper abdomen, is related to the cyst. The cyst is also small and I have very low suspicion that she has ovarian torsion. OB consulted and presented to the ED to see the patient. OB agrees that pain is very unlikely to be related to this cyst.    Patient is not comfortable with discharge to home. There are no beds available at [facility], however [facility] does have a Med-Surg bed and patient and mother are willing to transfer. Dr. Hardy accepted the patient in transfer and EMTALA forms signed. I do not think she requires ambulance transfer and mother is comfortable driving her directly to [facility] for direct admission. Patient stable throughout ED course.         At the conclusion of the encounter I discussed the results of all the tests and the disposition. The questions were answered. The patient and family acknowledged understanding and were agreeable with the care plan.    Medical Decision Making    History:  Supplemental history from: Documented in chart, if applicable  External Record(s) reviewed: Inpatient Record: 11/2/23 [facility] ED    Work Up:  Chart documentation includes differential considered and any EKGs or imaging independently interpreted by provider,  "where specified.  In additional to work up documented, I considered the following work up: Documented in chart, if applicable.    Considered repeat CT abdomen / pelvis, however risks felt to outweigh benefit    External consultation:  Discussion of management with another provider: Documented in chart, if applicable and Hospitalist and OB-Gyn    Complicating factors:  Care impacted by chronic illness: Hypertension, Mental Health, and Other: morbid obesity  Care affected by social determinants of health: N/A    Disposition considerations: Admit.    MEDICATIONS GIVEN IN THE EMERGENCY DEPARTMENT:  Medications   sodium chloride 0.9% BOLUS 1,000 mL (0 mLs Intravenous Stopped 11/3/23 1834)   ondansetron (ZOFRAN) injection 4 mg (4 mg Intravenous $Given 11/3/23 1749)   ketorolac (TORADOL) injection 15 mg (15 mg Intravenous $Given 11/3/23 1749)   oxyCODONE (ROXICODONE) tablet 5 mg (5 mg Oral $Given 11/3/23 1911)       NEW PRESCRIPTIONS STARTED AT TODAY'S ED VISIT:  Discharge Medication List as of 11/3/2023 10:44 PM          HPI     Evie Clement is a 28 year old female, history of HTN, mood disorder, morbid obesity and s/p cholecystectomy, who returns to this ED with her mother for evaluation of abdominal pain. The pain started 4 days ago and has been constant since onset. The pain is generalized in location, but more prominent in the upper abdomen. She was seen in this ED for the pain yesterday with CT abdomen / pelvis and pelvic ultrasound demonstrating a simple 3.7cm left ovarian cyst. OB was consulted and patient was discharged to home with pain medications.    She returns today for ongoing, worsening pain that she describes as sharp, cramping, aching and as though a \"brick is on her abdomen\". The pain is worse with movement. She reports that she was up all through the night with multiple episodes of vomiting. No diarrhea and she reports no BM in 4 days. She denies urinary symptoms, fevers.     Per chart " review,  23 Thee patient visited [facility] ED for abdominal pain. CT without any evidence of appendicitis or other acute abnormality. There was a small left-sided ovarian cyst. Ultrasound showed a 2 to 3 cm simple/follicular left ovarian cyst with some blood flow going to this ovary. The other ovary was normal and the uterus was normal. There is no edema around the ovary. There was no ascites noted. Since her pain is somewhat better with oral pain meds we will send her home with these and to continue her antibiotics for her UTI.         Medical History     Past Medical History:   Diagnosis Date    Depression, recurrent (H24) 10/05/2017    RUDDY (generalized anxiety disorder) 2018    Hypertension 2021    Morbid obesity (H) 2023    Nexplanon in place 2018       Past Surgical History:   Procedure Laterality Date     SECTION      LAPAROSCOPIC CHOLECYSTECTOMY         No family history on file.         acetaminophen (TYLENOL) 500 MG tablet  albuterol (PROAIR HFA/PROVENTIL HFA/VENTOLIN HFA) 108 (90 BASE) MCG/ACT Inhaler  cholecalciferol (VITAMIN D3) 25 mcg (1000 units) capsule  clindamycin (CLEOCIN) 2 % vaginal cream  doxycycline monohydrate (MONODOX) 100 MG capsule  ibuprofen (ADVIL/MOTRIN) 200 MG tablet  lamoTRIgine (LAMICTAL) 100 MG tablet  lisinopril (ZESTRIL) 5 MG tablet  oxyCODONE-acetaminophen (PERCOCET) 5-325 MG tablet  propranolol (INDERAL) 20 MG tablet        Physical Exam     First Vitals:  Patient Vitals for the past 24 hrs:   BP Temp Temp src Pulse Resp SpO2 Weight   23 2217 (!) 140/67 98  F (36.7  C) Oral 90 18 98 % --   23 2045 (!) 148/68 -- -- 92 -- 99 % --   23 (!) 144/78 -- -- 95 -- 98 % --   23 1630 (!) 159/82 98.8  F (37.1  C) Oral 98 20 97 % (!) 185 kg (407 lb 13.6 oz)       PHYSICAL EXAM:   Physical Exam    GENERAL: Awake, alert.  In mild acute distress.   HEENT: Normocephalic, atraumatic.   NECK: No stridor.  PULMONARY: Symmetrical  breath sounds without distress.  Lungs clear to auscultation bilaterally without wheezes, rhonchi or rales.  CARDIO: Borderline tachycardic rate with regular rhythm.  No significant murmur, rub or gallop.    ABDOMINAL: Abdomen soft, non-distended with mild to moderate tenderness to palpation across the upper abdomen and mild tenderness to palpation across the lower abdomen; no rebound tenderness or guarding.   EXTREMITIES: No lower extremity swelling or edema.      NEURO: Alert and oriented to person, place and time.  Cranial nerves grossly intact.  No focal motor deficit.  PSYCH: Normal mood and affect.      Results     LAB:  All pertinent labs reviewed and interpreted  Labs Ordered and Resulted from Time of ED Arrival to Time of ED Departure   CBC WITH PLATELETS AND DIFFERENTIAL - Abnormal       Result Value    WBC Count 16.6 (*)     RBC Count 4.93      Hemoglobin 12.0      Hematocrit 38.4      MCV 78      MCH 24.3 (*)     MCHC 31.3 (*)     RDW 14.5      Platelet Count 230      % Neutrophils 90      % Lymphocytes 5      % Monocytes 4      % Eosinophils 0      % Basophils 0      % Immature Granulocytes 1      NRBCs per 100 WBC 0      Absolute Neutrophils 15.0 (*)     Absolute Lymphocytes 0.9      Absolute Monocytes 0.7      Absolute Eosinophils 0.0      Absolute Basophils 0.0      Absolute Immature Granulocytes 0.1      Absolute NRBCs 0.0     BASIC METABOLIC PANEL - Abnormal    Sodium 135      Potassium 4.2      Chloride 99      Carbon Dioxide (CO2) 25      Anion Gap 11      Urea Nitrogen 7.0      Creatinine 0.63      GFR Estimate >90      Calcium 9.8      Glucose 135 (*)    HEPATIC FUNCTION PANEL - Abnormal    Protein Total 7.7      Albumin 4.1      Bilirubin Total 1.0      Alkaline Phosphatase 118 (*)     AST 26      ALT 45      Bilirubin Direct 0.34 (*)    LIPASE - Abnormal    Lipase 10 (*)        RADIOLOGY:  US Pelvis Cmplt w Transvag & Doppler LmtPel Duplex Limited   Final Result   IMPRESSION:     1.   Evaluation limited by depth of the ovaries, which were seen on transabdominal imaging only. Within this limitation, no convincing evidence of ovarian torsion.      2.  Unchanged simple appearing left ovarian 3.6 cm cyst.                     I, Rajeev Umana, am serving as a scribe to document services personally performed by Liza Rosales MD based on my observation and the provider's statements to me. I, Liza Rosales MD attest that Rajeev Umana is acting in a scribe capacity, has observed my performance of the services and has documented them in accordance with my direction.    Liza Rosales MD  Emergency Medicine  [facility] EMERGENCY DEPARTMENT     Liza Rosales MD  11/04/23 1126       Liza Rosales MD  11/04/23 1126

## 2023-11-04 VITALS
WEIGHT: 293 LBS | OXYGEN SATURATION: 99 % | DIASTOLIC BLOOD PRESSURE: 89 MMHG | HEART RATE: 86 BPM | TEMPERATURE: 98.6 F | SYSTOLIC BLOOD PRESSURE: 167 MMHG | BODY MASS INDEX: 41.95 KG/M2 | RESPIRATION RATE: 18 BRPM | HEIGHT: 70 IN

## 2023-11-04 PROBLEM — Z97.5 NEXPLANON IN PLACE: Status: ACTIVE | Noted: 2018-06-06

## 2023-11-04 PROBLEM — E66.01 MORBID OBESITY (H): Chronic | Status: ACTIVE | Noted: 2023-11-04

## 2023-11-04 PROBLEM — Z97.5 NEXPLANON IN PLACE: Chronic | Status: ACTIVE | Noted: 2018-06-06

## 2023-11-04 PROBLEM — R10.84 GENERALIZED ABDOMINAL PAIN: Status: ACTIVE | Noted: 2023-11-04

## 2023-11-04 PROBLEM — F41.1 GAD (GENERALIZED ANXIETY DISORDER): Status: ACTIVE | Noted: 2018-02-02

## 2023-11-04 PROBLEM — I10 HYPERTENSION: Status: ACTIVE | Noted: 2021-06-02

## 2023-11-04 PROBLEM — F33.9 DEPRESSION, RECURRENT (H): Status: ACTIVE | Noted: 2017-10-05

## 2023-11-04 LAB
ANION GAP SERPL CALCULATED.3IONS-SCNC: 10 MMOL/L (ref 7–15)
BACTERIA UR CULT: NORMAL
BUN SERPL-MCNC: 8.4 MG/DL (ref 6–20)
CALCIUM SERPL-MCNC: 9 MG/DL (ref 8.6–10)
CHLORIDE SERPL-SCNC: 104 MMOL/L (ref 98–107)
CREAT SERPL-MCNC: 0.66 MG/DL (ref 0.51–0.95)
DEPRECATED HCO3 PLAS-SCNC: 25 MMOL/L (ref 22–29)
EGFRCR SERPLBLD CKD-EPI 2021: >90 ML/MIN/1.73M2
ERYTHROCYTE [DISTWIDTH] IN BLOOD BY AUTOMATED COUNT: 14.5 % (ref 10–15)
GLUCOSE SERPL-MCNC: 97 MG/DL (ref 70–99)
HCT VFR BLD AUTO: 34 % (ref 35–47)
HGB BLD-MCNC: 10.3 G/DL (ref 11.7–15.7)
MCH RBC QN AUTO: 24.2 PG (ref 26.5–33)
MCHC RBC AUTO-ENTMCNC: 30.3 G/DL (ref 31.5–36.5)
MCV RBC AUTO: 80 FL (ref 78–100)
PLATELET # BLD AUTO: 187 10E3/UL (ref 150–450)
POTASSIUM SERPL-SCNC: 3.6 MMOL/L (ref 3.4–5.3)
RBC # BLD AUTO: 4.26 10E6/UL (ref 3.8–5.2)
SODIUM SERPL-SCNC: 139 MMOL/L (ref 135–145)
WBC # BLD AUTO: 10.7 10E3/UL (ref 4–11)

## 2023-11-04 PROCEDURE — 99235 HOSP IP/OBS SAME DATE MOD 70: CPT | Performed by: HOSPITALIST

## 2023-11-04 PROCEDURE — 85027 COMPLETE CBC AUTOMATED: CPT | Performed by: HOSPITALIST

## 2023-11-04 PROCEDURE — 96374 THER/PROPH/DIAG INJ IV PUSH: CPT

## 2023-11-04 PROCEDURE — 258N000003 HC RX IP 258 OP 636: Performed by: HOSPITALIST

## 2023-11-04 PROCEDURE — 99207 PR NO BILLABLE SERVICE THIS VISIT: CPT | Performed by: INTERNAL MEDICINE

## 2023-11-04 PROCEDURE — 96361 HYDRATE IV INFUSION ADD-ON: CPT

## 2023-11-04 PROCEDURE — 36415 COLL VENOUS BLD VENIPUNCTURE: CPT | Performed by: HOSPITALIST

## 2023-11-04 PROCEDURE — 96375 TX/PRO/DX INJ NEW DRUG ADDON: CPT

## 2023-11-04 PROCEDURE — 250N000013 HC RX MED GY IP 250 OP 250 PS 637: Performed by: HOSPITALIST

## 2023-11-04 PROCEDURE — 250N000011 HC RX IP 250 OP 636: Mod: JZ | Performed by: HOSPITALIST

## 2023-11-04 PROCEDURE — 80048 BASIC METABOLIC PNL TOTAL CA: CPT | Performed by: HOSPITALIST

## 2023-11-04 PROCEDURE — G0378 HOSPITAL OBSERVATION PER HR: HCPCS

## 2023-11-04 RX ORDER — PROPRANOLOL HYDROCHLORIDE 20 MG/1
20 TABLET ORAL 2 TIMES DAILY
Status: DISCONTINUED | OUTPATIENT
Start: 2023-11-04 | End: 2023-11-04 | Stop reason: HOSPADM

## 2023-11-04 RX ORDER — CLINDAMYCIN PHOSPHATE 20 MG/G
1 CREAM VAGINAL AT BEDTIME
COMMUNITY
Start: 2023-11-02 | End: 2023-11-09

## 2023-11-04 RX ORDER — LACTULOSE 10 G/15ML
20 SOLUTION ORAL 3 TIMES DAILY
Status: DISCONTINUED | OUTPATIENT
Start: 2023-11-04 | End: 2023-11-04 | Stop reason: HOSPADM

## 2023-11-04 RX ORDER — ONDANSETRON 2 MG/ML
4 INJECTION INTRAMUSCULAR; INTRAVENOUS EVERY 6 HOURS PRN
Status: DISCONTINUED | OUTPATIENT
Start: 2023-11-04 | End: 2023-11-04 | Stop reason: HOSPADM

## 2023-11-04 RX ORDER — LAMOTRIGINE 100 MG/1
100 TABLET ORAL DAILY
Status: DISCONTINUED | OUTPATIENT
Start: 2023-11-04 | End: 2023-11-04 | Stop reason: HOSPADM

## 2023-11-04 RX ORDER — DOXYCYCLINE 100 MG/1
100 CAPSULE ORAL 2 TIMES DAILY
COMMUNITY
Start: 2023-11-02 | End: 2023-11-09

## 2023-11-04 RX ORDER — KETOROLAC TROMETHAMINE 30 MG/ML
30 INJECTION, SOLUTION INTRAMUSCULAR; INTRAVENOUS EVERY 6 HOURS PRN
Status: DISCONTINUED | OUTPATIENT
Start: 2023-11-04 | End: 2023-11-04 | Stop reason: HOSPADM

## 2023-11-04 RX ORDER — SODIUM CHLORIDE 9 MG/ML
INJECTION, SOLUTION INTRAVENOUS CONTINUOUS
Status: DISCONTINUED | OUTPATIENT
Start: 2023-11-04 | End: 2023-11-04 | Stop reason: HOSPADM

## 2023-11-04 RX ORDER — BISACODYL 5 MG
10 TABLET, DELAYED RELEASE (ENTERIC COATED) ORAL DAILY
Status: DISCONTINUED | OUTPATIENT
Start: 2023-11-04 | End: 2023-11-04 | Stop reason: HOSPADM

## 2023-11-04 RX ORDER — CEFTRIAXONE 1 G/1
1 INJECTION, POWDER, FOR SOLUTION INTRAMUSCULAR; INTRAVENOUS EVERY 24 HOURS
Status: DISCONTINUED | OUTPATIENT
Start: 2023-11-04 | End: 2023-11-04 | Stop reason: HOSPADM

## 2023-11-04 RX ORDER — ACETAMINOPHEN 500 MG
500-1000 TABLET ORAL EVERY 6 HOURS PRN
Status: DISCONTINUED | OUTPATIENT
Start: 2023-11-04 | End: 2023-11-04 | Stop reason: HOSPADM

## 2023-11-04 RX ORDER — AMOXICILLIN 250 MG
2 CAPSULE ORAL 2 TIMES DAILY
Status: DISCONTINUED | OUTPATIENT
Start: 2023-11-04 | End: 2023-11-04 | Stop reason: HOSPADM

## 2023-11-04 RX ORDER — AMOXICILLIN 250 MG
1 CAPSULE ORAL 2 TIMES DAILY
Status: DISCONTINUED | OUTPATIENT
Start: 2023-11-04 | End: 2023-11-04 | Stop reason: HOSPADM

## 2023-11-04 RX ORDER — LISINOPRIL 5 MG/1
5 TABLET ORAL DAILY
Status: DISCONTINUED | OUTPATIENT
Start: 2023-11-04 | End: 2023-11-04 | Stop reason: HOSPADM

## 2023-11-04 RX ORDER — ALBUTEROL SULFATE 90 UG/1
2 AEROSOL, METERED RESPIRATORY (INHALATION) EVERY 6 HOURS PRN
Status: DISCONTINUED | OUTPATIENT
Start: 2023-11-04 | End: 2023-11-04 | Stop reason: HOSPADM

## 2023-11-04 RX ORDER — ONDANSETRON 4 MG/1
4 TABLET, ORALLY DISINTEGRATING ORAL EVERY 6 HOURS PRN
Status: DISCONTINUED | OUTPATIENT
Start: 2023-11-04 | End: 2023-11-04 | Stop reason: HOSPADM

## 2023-11-04 RX ADMIN — LISINOPRIL 5 MG: 5 TABLET ORAL at 10:13

## 2023-11-04 RX ADMIN — LACTULOSE 20 G: 20 SOLUTION ORAL at 01:39

## 2023-11-04 RX ADMIN — PROPRANOLOL HYDROCHLORIDE 20 MG: 20 TABLET ORAL at 10:13

## 2023-11-04 RX ADMIN — BISACODYL 10 MG: 5 TABLET, COATED ORAL at 01:38

## 2023-11-04 RX ADMIN — SODIUM CHLORIDE: 900 INJECTION INTRAVENOUS at 01:26

## 2023-11-04 RX ADMIN — KETOROLAC TROMETHAMINE 30 MG: 30 INJECTION INTRAMUSCULAR; INTRAVENOUS at 01:55

## 2023-11-04 RX ADMIN — ONDANSETRON 4 MG: 2 INJECTION INTRAMUSCULAR; INTRAVENOUS at 01:38

## 2023-11-04 RX ADMIN — ACETAMINOPHEN 1000 MG: 500 TABLET, FILM COATED ORAL at 01:39

## 2023-11-04 RX ADMIN — CEFTRIAXONE SODIUM 1 G: 1 INJECTION, POWDER, FOR SOLUTION INTRAMUSCULAR; INTRAVENOUS at 01:38

## 2023-11-04 RX ADMIN — ACETAMINOPHEN 1000 MG: 500 TABLET, FILM COATED ORAL at 10:21

## 2023-11-04 ASSESSMENT — ACTIVITIES OF DAILY LIVING (ADL)
ADLS_ACUITY_SCORE: 20

## 2023-11-04 NOTE — PROGRESS NOTES
"[facility] [facility] ADMISSION NOTE    Patient admitted to room 2402 at approximately 2332 via wheel chair from [facility] ED (Mom drove her). Patient was accompanied by mother.     Verbal SBAR report received from [facility] ED nurse (charge nurse received) prior to patient arrival.     Patient ambulated to bed with stand-by assist. Patient alert and oriented X 3. Pain is not well controlled.  Medication(s) being used: nothing ordered as of admission.  . Admission vital signs: Blood pressure (!) 150/93, pulse 89, temperature 98.1  F (36.7  C), temperature source Oral, resp. rate 18, height 1.778 m (5' 10\"), weight (!) 190.1 kg (419 lb 1.5 oz), last menstrual period 11/02/2023, SpO2 96%, not currently breastfeeding. Patient was oriented to plan of care, call light, bed controls, tv, telephone, bathroom, and visiting hours.     Risk Assessment    The following safety risks were identified during admission: fall. Yellow risk band applied: YES.     Skin Initial Assessment    This writer admitted this patient and completed a full skin assessment and Santosh score in the Adult PCS flowsheet. Appropriate interventions initiated as needed.       Education    Patient has a [place] to Observation order: Yes  Observation education completed and documented: Yes      Josette Carvajal RN      "

## 2023-11-04 NOTE — DISCHARGE SUMMARY
[facility]  Hospitalist Discharge Summary       Date of Admission:  11/3/2023  Date of Discharge:  11/4/2023 10:41 AM  Discharging Provider: Dar Clifford MD      Discharge Diagnoses     Abdominal pain  Possible ruptured ovarian cyst  Probable pelvic inflammatory disease due to gonorrhea  UTI  Hypertension  Contraception    Follow-ups Needed After Discharge   Follow-up Appointments     Follow-up and recommended labs and tests       Follow up with primary care provider, Physician No Ref-Primary, within 7   days for hospital follow- up.  The following labs/tests are recommended:   CBC and CMP.          Discharge Disposition   Discharged to home  Condition at discharge: Stable    Hospital Course   Evie Clement is a 28 year old female admitted on 11/3/2023. She was seen at [facility] and transferred to [facility] due to generalized abdominal pain, likely due to a ruptured ovarian cyst.    Abdominal pain  Possible ruptured ovarian cyst  Probable pelvic inflammatory disease due to gonorrhea  Pelvic ultrasound negative for ovarian torsion there is a simple left ovarian 3.6 cm cyst.  - Pain control with acetaminophen and ketorolac  - Nontoxic appearing in morning, discharged home    Addendum: Outside vaginal swab noted to be positive for gonorrhea.  Patient should be adequately covered with IV Rocephin.  We will add metronidazole 500 mg twice daily x14 days for pelvic inflammatory disease as well as Norco 5 mg every 6 hours for pain control.      UTI  Patient started on doxycycline by PCP on 11/2, urine culture no growth.  - Discontinue doxycycline    Hypertension  Continue lisinopril    Contraception  Nexplanon placed in 2021    Consultations This Hospital Stay   None    Code Status   Full Code    55 MINUTES SPENT BY ME on the date of service doing chart review, history, exam, documentation & further activities per the note.         Dar Clifford MD  [facility]  [facility]  ______________________________________________________________________    Physical Exam   Vital Signs: Temp: 98.6  F (37  C) Temp src: Oral BP: (!) 167/89 Pulse: 86   Resp: 18 SpO2: 99 % O2 Device: None (Room air)    Weight: 419 lbs 1.51 oz       Gen: Obese well developed, alert and oriented x 3, no acute distressed  HEENT: Atraumatic, normocephalic; sclera non-injected, anicterric; oral mucosa moist, no lesion, no exudate  Lungs: Clear to ausculation, no wheezes, no rhonchi, no rales  Heart: Regular rate, regular rhythm, no gallops, no rubs, no murmurs  GI: Bowel sound normal, no hepatosplenomegaly, no masses, tender, non-distended, no guarding, no rebound tenderness  Lymph: No lymphadenopathy, no edema  Skin: No rashes, no chronic venous stasis     Primary Care Physician   Physician No Ref-Primary    Discharge Orders      Reason for your hospital stay    This is a 28 year old female admitted with a ruptured ovarian cyst.     Follow-up and recommended labs and tests     Follow up with primary care provider, Physician No Ref-Primary, within 7 days for hospital follow- up.  The following labs/tests are recommended: CBC and CMP.     Activity    Your activity upon discharge: activity as tolerated     Full Code     Diet    Follow this diet upon discharge: Orders Placed This Encounter      Clear Liquid Diet         Significant Results and Procedures   Most Recent 3 CBC's:  Recent Labs   Lab Test 11/04/23  0519 11/03/23  1707 11/02/23  1908   WBC 10.7 16.6* 10.8   HGB 10.3* 12.0 11.5*   MCV 80 78 79    230 236     Most Recent 3 BMP's:  Recent Labs   Lab Test 11/04/23  0519 11/03/23  1722 11/02/23  1908    135 140   POTASSIUM 3.6 4.2 4.3   CHLORIDE 104 99 103   CO2 25 25 25   BUN 8.4 7.0 11.1   CR 0.66 0.63 0.70   ANIONGAP 10 11 12   MARGY 9.0 9.8 9.9   GLC 97 135* 97     Most Recent 2 LFT's:  Recent Labs   Lab Test 11/03/23  1722 11/02/23  1908   AST 26 12   ALT 45 13   ALKPHOS 118* 107*    BILITOTAL 1.0 0.2   ,   Results for orders placed or performed during the hospital encounter of 11/03/23   US Pelvis Cmplt w Transvag & Doppler LmtPel Duplex Limited    Narrative    EXAM: US PELVIS COMPLETE W TRANSVAGINAL AND DOPPLER LIMITED  LOCATION: [facility]  DATE: 11/3/2023    INDICATION: Abdominal pain. Left ovarian cyst. Evaluate for torsion.  COMPARISON: 11/02/2023  TECHNIQUE: Transabdominal scans were performed. Endovaginal ultrasound was performed to better visualize the adnexa. Color flow with spectral Doppler and waveform analysis performed.    FINDINGS:    UTERUS: 8.0 x 5.6 x 4.5 cm. Normal in size and position with no masses. Small amount of fluid within the endocervical canal and lower uterine segment. Nabothian cysts in the cervix.    ENDOMETRIUM: 4 mm. Normal smooth endometrium.    RIGHT OVARY: Seen on transabdominal imaging only. Obscured on transvaginal imaging by overlying bowel gas. Measures 2.7 x 2.1 x 2.1 cm. Normal appearance. Venous duplex flow identified. Arterial pulsations seen on spectral Doppler, although waveforms not   visualized, most likely due to technical limitations related to depth of the ovary.     LEFT OVARY: Seen on transabdominal imaging only. Obscured on transvaginal imaging by overlying bowel gas. Measures 4.6 x 4.4 x 3.5 cm. Unchanged appearing cyst measuring 3.6 x 3.3 x 3.2 cm. Otherwise normal appearance. Venous duplex flow identified.   Arterial pulsations seen on spectral Doppler, although waveforms not visualized, most likely due to technical limitations related to depth of the ovary.     No significant free fluid.      Impression    IMPRESSION:    1.  Evaluation limited by depth of the ovaries, which were seen on transabdominal imaging only. Within this limitation, no convincing evidence of ovarian torsion.    2.  Unchanged simple appearing left ovarian 3.6 cm cyst.             Discharge Medications   Current Discharge Medication List         CONTINUE these medications which have NOT CHANGED    Details   acetaminophen (TYLENOL) 500 MG tablet Take 500-1,000 mg by mouth every 6 hours as needed for mild pain      cholecalciferol (VITAMIN D3) 25 mcg (1000 units) capsule Take 2 capsules by mouth daily      clindamycin (CLEOCIN) 2 % vaginal cream Place 1 Applicatorful vaginally at bedtime      doxycycline monohydrate (MONODOX) 100 MG capsule Take 100 mg by mouth 2 times daily      ibuprofen (ADVIL/MOTRIN) 200 MG tablet Take 600 mg by mouth every 4 hours as needed for pain      lamoTRIgine (LAMICTAL) 100 MG tablet Take 100 mg by mouth daily      lisinopril (ZESTRIL) 5 MG tablet Take 5 mg by mouth daily      oxyCODONE-acetaminophen (PERCOCET) 5-325 MG tablet Take 1 tablet by mouth every 6 hours as needed for pain  Qty: 12 tablet, Refills: 0      propranolol (INDERAL) 20 MG tablet Take 20 mg by mouth 2 times daily  20 mg or 40 mg BID      albuterol (PROAIR HFA/PROVENTIL HFA/VENTOLIN HFA) 108 (90 BASE) MCG/ACT Inhaler Inhale 2 puffs into the lungs every 6 hours as needed for shortness of breath / dyspnea or wheezing  Qty: 1 Inhaler, Refills: 0           Allergies   Allergies   Allergen Reactions    Metronidazole Other (See Comments)     Vaginal discomfort.    Nifedipine Dizziness and Rash

## 2023-11-04 NOTE — PLAN OF CARE
"Goal Outcome Evaluation:         Problem: Pain Acute  Goal: Optimal Pain Control and Function  Outcome: Not Progressing     Problem: Constipation  Goal: Effective Bowel Elimination  Outcome: Not Progressing     Pt A&O x 4, SBA to the bathroom,  ml/hr, c/o nausea and given Zofran x 1, c/o 8/10 abdominal pain which is all over the abdomen but worse in the upper quadrants, given tylenol + toradol per MAR, no BM for 4 days and started on bowel regimen, sleeping between cares, able to make needs known    BP (!) 162/85 (BP Location: Left arm)   Pulse 78   Temp 98.3  F (36.8  C) (Oral)   Resp 16   Ht 1.778 m (5' 10\")   Wt (!) 190.1 kg (419 lb 1.5 oz)   LMP 11/02/2023 (Exact Date)   SpO2 96%   BMI 60.13 kg/m                 "

## 2023-11-04 NOTE — PROGRESS NOTES
Patient wanting to leave feels she is not getting enough pain relief or help, she did speak with MD this. Pt discharged. IV removed and pt dressed self. Wheelchair given and pt wheeled to ,  is here. Belongings sent with patient.

## 2023-11-04 NOTE — CONSULTS
CONSULTATION - GYN    NAME: Evie Clement   : 1995   MRN: [id number]      [facility]    ADMISSION DATE: 11/3/2023    PCP:  No Ref-Primary, Physician     CHIEF COMPLAINT: Persistent abdominal pain    HPI: I have been requested by Dr. Liza Cordova to evaluate Evie Clement for abdominal pain; ovarian cyst. Patient is a 28 year old  1 para 1-0-0-1 female, that has returned to the emergency department with report of persistent abdominal pain.  Patient was seen in the emergency department yesterday.  At that time, she had a ultrasound that revealed a 3.7 cm simple ovarian cyst.  She was seen by my partner, Dr. Jean Casper.  They discussed various etiologies for the pain, and the patient was ultimately discharged to home.      Patient reports the abdominal pain started in the lower pelvis.  She states it has now moved above her umbilicus and is most severe in the epigastric area.    She returns now with increased abdominal pain and nausea and vomiting.  The patient is on cycle day 2.  She states she has not been able to eat much secondary to nausea.  She has not had a bowel movement in 4 days time.  She was diagnosed with a urinary tract infection on last evaluation and was treated with antibiotics.  However, the patient states she has not been able to take the antibiotics secondary to the nausea.  History is obtained through the patient and chart review. Patient states that these symptoms began approximately 4 days ago.         PAST MEDICAL HISTORY:  Hypertension    PAST SURGICAL HISTORY:  Laparoscopic cholecystectomy-    SOCIAL HISTORY:  Patient reports weekly marijuana use  She denies alcohol or tobacco use    MEDICATIONS:  No current facility-administered medications for this encounter.     Current Outpatient Medications   Medication    albuterol (PROAIR HFA/PROVENTIL HFA/VENTOLIN HFA) 108 (90 BASE) MCG/ACT Inhaler    oxyCODONE-acetaminophen (PERCOCET) 5-325 MG tablet        ALLERGIES:  Allergies   Allergen Reactions    Metronidazole Other (See Comments)     Vaginal discomfort.    Nifedipine Dizziness and Rash       REVIEW OF SYSTEMS    Negative except what is stated in the HPI    PHYSICAL EXAM:  BP (!) 144/78   Pulse 95   Temp 98.8  F (37.1  C) (Oral)   Resp 20   Wt (!) 185 kg (407 lb 13.6 oz)   LMP 11/02/2023 (Exact Date)   SpO2 98%   BMI 58.52 kg/m     General Appearance: Alert, appropriate appearance for age. No acute distress, morbidly obese.  Appears very comfortable until she is required to move.  HEENT : Grossly normal  Gastrointestinal: Tender to light touch diffusely.  Positive rebound.  No guarding.  No rigidity.    Pelvic Exam Female: Light bleeding present.  Limited secondary to habitus  Musculoskeletal Exam: Back is non-tender, no cva tenderness, moving all four extremities  Skin: no rash or abnormalities,   Neurologic: Normal gait and speech   Psychiatric: Alert and oriented, appropriate affect.    LABS  Lab Results   Component Value Date    HGB 12.0 11/03/2023     IMAGING:  EXAM: US PELVIS COMPLETE W TRANSVAGINAL AND DOPPLER LIMITED  LOCATION: [facility]  DATE: 11/3/2023     INDICATION: Abdominal pain. Left ovarian cyst. Evaluate for torsion.  COMPARISON: 11/02/2023  TECHNIQUE: Transabdominal scans were performed. Endovaginal ultrasound was performed to better visualize the adnexa. Color flow with spectral Doppler and waveform analysis performed.     FINDINGS:     UTERUS: 8.0 x 5.6 x 4.5 cm. Normal in size and position with no masses. Small amount of fluid within the endocervical canal and lower uterine segment. Nabothian cysts in the cervix.     ENDOMETRIUM: 4 mm. Normal smooth endometrium.     RIGHT OVARY: Seen on transabdominal imaging only. Obscured on transvaginal imaging by overlying bowel gas. Measures 2.7 x 2.1 x 2.1 cm. Normal appearance. Venous duplex flow identified. Arterial pulsations seen on spectral Doppler, although  waveforms not   visualized, most likely due to technical limitations related to depth of the ovary.      LEFT OVARY: Seen on transabdominal imaging only. Obscured on transvaginal imaging by overlying bowel gas. Measures 4.6 x 4.4 x 3.5 cm. Unchanged appearing cyst measuring 3.6 x 3.3 x 3.2 cm. Otherwise normal appearance. Venous duplex flow identified.   Arterial pulsations seen on spectral Doppler, although waveforms not visualized, most likely due to technical limitations related to depth of the ovary.      No significant free fluid.                                                                      IMPRESSION:    1.  Evaluation limited by depth of the ovaries, which were seen on transabdominal imaging only. Within this limitation, no convincing evidence of ovarian torsion.     2.  Unchanged simple appearing left ovarian 3.6 cm cyst.    IMPRESSION:  28 year old -0-0-1  Persistent diffuse abdominal pain -etiology would include ovarian cyst, gastritis, appendicitis, constipation, or UTI.  UTI -unable to treat thus far  Constipation    RECOMMENDATIONS:  We discussed possible etiologies.  There is a low likelihood that a cyst of this size would cause a torsion.  Additionally, there is good venous blood flow identified.  There is a small possibility that the cyst could be leaking or bleeding and that is causing peritoneal signs.  However, I discussed with the patient that I feel surgical exploration would pose more risks than solutions.  Patient is in agreement.  Therefore, we will avoid any surgical intervention.    With regards to long-term follow-up, I would recommend a repeat ultrasound in 6 weeks to reevaluate the left ovarian cyst.    At this time, the patient is scheduled to be transferred to [facility] for observation.  Plan is for serial abdominal exams overnight.    Thank you for allowing us to participate in the care of this patient.  Please contact us with any questions/concerns.    Tahmina LI  Khurram, DO

## 2023-11-04 NOTE — ED NOTES
Pt being moved back to  A. Pt to be transferred to [facility]. Discussed with Dr. Rosales that if pt's mother and pt feels ok with pt going private car that is appropriate. Can leave saline lock in per ok with MARISA SHEPARD.

## 2023-11-04 NOTE — ED NOTES
Talked to pt and pt's mother--ok with driving to [employer]. Talked to pt about diagnosis, pt feels better about plan of care.

## 2023-11-04 NOTE — H&P
[facility]    History and Physical - Hospitalist Service       Date of Admission:  11/3/2023    Assessment & Plan      Evie Clement is a 28 year old female admitted on 11/3/2023. She was seen at [facility] and transferred to [facility] due to generalized abd pain and needing continued abd exams.    GENERALIZED ABD PAIN:  Seen on 11/2 and pain in lower quadrants and then ddx included ovarian cyst and torsion and UTI.  Pain not severe and she was sent home with antibiotics.  Unable to take the antibiotics and now pain more generalized.  Has had some constipation.  No suggestion of SBO, Urine cx is still neg, s/p lap geronimo and pain now generalized so unlikely due to ovarian cyst.  Doubt is referred pain from back.  Will hydrate and treat constipation aggressively with lactulose and ducolax.  Reassess in AM.  Toradol and avoid narcotics.    LEUCOCYTOSIS:  WBC 10 on 11/2 and 16 on 11/3.  Could be due to N/V.  Recheck in AM    ABNORMAL URINE:  UA abnl and culture is negative at 24 hours.  Will give one dose of ceftriaxone to cover infection if there.    HYPERTENSION:  Continue lisinopril    OVARIAN CYST:  Can follow up with GYN as outpt.    CONTRACEPTION:  Nexplanon placed in 2021.     Observation Goals: -diagnostic tests and consults completed and resulted, -vital signs normal or at patient baseline, -tolerating oral intake to maintain hydration, -adequate pain control on oral analgesics, Nurse to notify provider when observation goals have been met and patient is ready for discharge.  Diet: Clear Liquid Dietclear diet  DVT Prophylaxis: Low Risk/Ambulatory with no VTE prophylaxis indicated  Dawson Catheter: Not present  Lines: None     Cardiac Monitoring: None  Code Status: Full Codefull and mother is poa.    Clinically Significant Risk Factors Present on Admission                  # Hypertension: Noted on problem list      # Severe Obesity: Estimated body mass index is 60.13 kg/m  as  "calculated from the following:    Height as of this encounter: 1.778 m (5' 10\").    Weight as of this encounter: 190.1 kg (419 lb 1.5 oz).              Disposition Plan      Expected Discharge Date: 2023                  Yin Hardy MD  Hospitalist Service  [facility]  Securely message with [email or web address] (more info)  Text page via [employer] Paging/Directory     ______________________________________________________________________    Chief Complaint   Abd pain, N/V    History is obtained from the patient    History of Present Illness   Evie Clement is a 28 year old female who he was seen at [facility] and transferred to [facility] due to generalized abd pain and needing continued abd exams. Seen on  and pain in pelvic region and US showed ovarian cysts.  Torsion was mentioned and UTI.  Pain not severe enough to admit so pt went home.  She states she had N/V and unable to keep down antitiotics for UTI.  Looks very comfortable and now pain generalized and more in upper quadrants.  No BM for 4 days but no constipation before then.  Imaging not repeated as pt had no emesis in ER.  No emesis here.      Past Medical History    Past Medical History:   Diagnosis Date    Depression, recurrent (H24) 10/05/2017    RUDDY (generalized anxiety disorder) 2018    Hypertension 2021    Morbid obesity (H) 2023    Nexplanon in place 2018       Past Surgical History   Past Surgical History:   Procedure Laterality Date     SECTION      LAPAROSCOPIC CHOLECYSTECTOMY         Prior to Admission Medications   Prior to Admission Medications   Prescriptions Last Dose Informant Patient Reported? Taking?   acetaminophen (TYLENOL) 500 MG tablet 11/3/2023  Yes Yes   Sig: Take 500-1,000 mg by mouth every 6 hours as needed for mild pain   albuterol (PROAIR HFA/PROVENTIL HFA/VENTOLIN HFA) 108 (90 BASE) MCG/ACT Inhaler   No No   Sig: Inhale 2 puffs into the lungs every 6 hours as " needed for shortness of breath / dyspnea or wheezing   cholecalciferol (VITAMIN D3) 25 mcg (1000 units) capsule   Yes Yes   Sig: Take 2 capsules by mouth daily   ibuprofen (ADVIL/MOTRIN) 200 MG tablet 11/3/2023  Yes Yes   Sig: Take 600 mg by mouth every 4 hours as needed for pain   lamoTRIgine (LAMICTAL) 100 MG tablet 11/3/2023  Yes Yes   Sig: Take 100 mg by mouth daily   lisinopril (ZESTRIL) 5 MG tablet 11/3/2023  Yes Yes   Sig: Take 5 mg by mouth daily   oxyCODONE-acetaminophen (PERCOCET) 5-325 MG tablet 11/3/2023  No Yes   Sig: Take 1 tablet by mouth every 6 hours as needed for pain   propranolol (INDERAL) 20 MG tablet 11/3/2023  Yes Yes   Sig: Take 20 mg by mouth 2 times daily  20 mg or 40 mg BID      Facility-Administered Medications: None        Review of Systems    The 10 point Review of Systems is negative other than noted in the HPI or here.     Social History   I have reviewed this patient's social history and updated it with pertinent information if needed.         Family History           Allergies   Allergies   Allergen Reactions    Metronidazole Other (See Comments)     Vaginal discomfort.    Nifedipine Dizziness and Rash        Physical Exam   Vital Signs: Temp: 98.1  F (36.7  C) Temp src: Oral BP: (!) 150/93 Pulse: 89   Resp: 18 SpO2: 96 % O2 Device: None (Room air)    Weight: 419 lbs 1.51 oz    General Appearance: wd morbidly obese woman in nad, very comfortable in bed.  Respiratory: lcta, no wheezing or dyspnea.  Cardiovascular: rrr  GI: morbidly obese but pain generalized and more upper quadrants on palpation.  Skin: no bruises/lesions on exposed skin.  Other: Alert and oriented.     Medical Decision Making             Data     I have personally reviewed the following data over the past 24 hrs:    16.6 (H)  \   12.0   / 230     135 99 7.0 /  135 (H)   4.2 25 0.63 \     ALT: 45 AST: 26 AP: 118 (H) TBILI: 1.0   ALB: 4.1 TOT PROTEIN: 7.7 LIPASE: 10 (L)       Imaging results reviewed over the past 24  hrs:   Recent Results (from the past 24 hour(s))   US Pelvis Cmplt w Transvag & Doppler LmtPel Duplex Limited    Narrative    EXAM: US PELVIS COMPLETE W TRANSVAGINAL AND DOPPLER LIMITED  LOCATION: [facility]  DATE: 11/3/2023    INDICATION: Abdominal pain. Left ovarian cyst. Evaluate for torsion.  COMPARISON: 11/02/2023  TECHNIQUE: Transabdominal scans were performed. Endovaginal ultrasound was performed to better visualize the adnexa. Color flow with spectral Doppler and waveform analysis performed.    FINDINGS:    UTERUS: 8.0 x 5.6 x 4.5 cm. Normal in size and position with no masses. Small amount of fluid within the endocervical canal and lower uterine segment. Nabothian cysts in the cervix.    ENDOMETRIUM: 4 mm. Normal smooth endometrium.    RIGHT OVARY: Seen on transabdominal imaging only. Obscured on transvaginal imaging by overlying bowel gas. Measures 2.7 x 2.1 x 2.1 cm. Normal appearance. Venous duplex flow identified. Arterial pulsations seen on spectral Doppler, although waveforms not   visualized, most likely due to technical limitations related to depth of the ovary.     LEFT OVARY: Seen on transabdominal imaging only. Obscured on transvaginal imaging by overlying bowel gas. Measures 4.6 x 4.4 x 3.5 cm. Unchanged appearing cyst measuring 3.6 x 3.3 x 3.2 cm. Otherwise normal appearance. Venous duplex flow identified.   Arterial pulsations seen on spectral Doppler, although waveforms not visualized, most likely due to technical limitations related to depth of the ovary.     No significant free fluid.      Impression    IMPRESSION:    1.  Evaluation limited by depth of the ovaries, which were seen on transabdominal imaging only. Within this limitation, no convincing evidence of ovarian torsion.    2.  Unchanged simple appearing left ovarian 3.6 cm cyst.           As the provider for the telehealth service, I attest that I introduced myself to the patient and provided my credentials.  Based on review of the patient s chart and/or a discussion with members of the patient s treatment team, I determined that telemedicine via a real-time, two-way, interactive audio and video platform is an appropriate means of providing this service. The patient and I mutually agreed that this visit is appropriate for telemedicine as well.